# Patient Record
Sex: FEMALE | Race: WHITE | Employment: OTHER | URBAN - METROPOLITAN AREA
[De-identification: names, ages, dates, MRNs, and addresses within clinical notes are randomized per-mention and may not be internally consistent; named-entity substitution may affect disease eponyms.]

---

## 2017-01-07 RX ORDER — HYDROCHLOROTHIAZIDE 12.5 MG/1
TABLET ORAL
Qty: 90 TABLET | Refills: 1 | Status: SHIPPED | OUTPATIENT
Start: 2017-01-07 | End: 2017-06-17

## 2017-02-09 ENCOUNTER — TELEPHONE (OUTPATIENT)
Dept: INTERNAL MEDICINE CLINIC | Facility: CLINIC | Age: 77
End: 2017-02-09

## 2017-02-24 ENCOUNTER — TELEPHONE (OUTPATIENT)
Dept: INTERNAL MEDICINE CLINIC | Facility: CLINIC | Age: 77
End: 2017-02-24

## 2017-03-03 ENCOUNTER — TELEPHONE (OUTPATIENT)
Dept: INTERNAL MEDICINE CLINIC | Facility: CLINIC | Age: 77
End: 2017-03-03

## 2017-04-11 ENCOUNTER — OFFICE VISIT (OUTPATIENT)
Dept: INTERNAL MEDICINE CLINIC | Facility: CLINIC | Age: 77
End: 2017-04-11

## 2017-04-11 VITALS
HEART RATE: 86 BPM | SYSTOLIC BLOOD PRESSURE: 136 MMHG | RESPIRATION RATE: 20 BRPM | WEIGHT: 151 LBS | BODY MASS INDEX: 26 KG/M2 | DIASTOLIC BLOOD PRESSURE: 77 MMHG

## 2017-04-11 DIAGNOSIS — I10 ESSENTIAL HYPERTENSION: Primary | ICD-10-CM

## 2017-04-11 DIAGNOSIS — M81.0 AGE-RELATED OSTEOPOROSIS WITHOUT CURRENT PATHOLOGICAL FRACTURE: ICD-10-CM

## 2017-04-11 DIAGNOSIS — E78.00 PURE HYPERCHOLESTEROLEMIA: ICD-10-CM

## 2017-04-11 DIAGNOSIS — E55.9 VITAMIN D DEFICIENCY: ICD-10-CM

## 2017-04-11 PROCEDURE — 99214 OFFICE O/P EST MOD 30 MIN: CPT | Performed by: INTERNAL MEDICINE

## 2017-04-11 PROCEDURE — G0463 HOSPITAL OUTPT CLINIC VISIT: HCPCS | Performed by: INTERNAL MEDICINE

## 2017-04-11 RX ORDER — RISEDRONATE SODIUM 5 MG/1
5 TABLET, FILM COATED ORAL
Qty: 90 TABLET | Refills: 3 | Status: SHIPPED | OUTPATIENT
Start: 2017-04-11 | End: 2017-10-26

## 2017-04-12 ENCOUNTER — LAB ENCOUNTER (OUTPATIENT)
Dept: LAB | Age: 77
End: 2017-04-12
Attending: INTERNAL MEDICINE
Payer: MEDICARE

## 2017-04-12 DIAGNOSIS — E78.00 PURE HYPERCHOLESTEROLEMIA: ICD-10-CM

## 2017-04-12 DIAGNOSIS — E55.9 VITAMIN D DEFICIENCY: ICD-10-CM

## 2017-04-12 DIAGNOSIS — I10 ESSENTIAL HYPERTENSION: ICD-10-CM

## 2017-04-12 PROCEDURE — 80061 LIPID PANEL: CPT

## 2017-04-12 PROCEDURE — 80053 COMPREHEN METABOLIC PANEL: CPT

## 2017-04-12 PROCEDURE — 36415 COLL VENOUS BLD VENIPUNCTURE: CPT

## 2017-04-12 PROCEDURE — 82306 VITAMIN D 25 HYDROXY: CPT

## 2017-04-12 PROCEDURE — 85025 COMPLETE CBC W/AUTO DIFF WBC: CPT

## 2017-04-16 NOTE — PROGRESS NOTES
HPI:    Patient ID: Melissa Navarro is a 69year old female presents for follow-up of hypertension. HPI  Patient states that she has been feeling well, she returns from for details and came back for appointment right away.   She has been taking all scleral icterus. Neck: Normal range of motion. Neck supple. No JVD present. Cardiovascular: Normal rate, regular rhythm and normal heart sounds. No murmur heard. Edema not present. Pulmonary/Chest: Effort normal. No respiratory distress.  She has

## 2017-04-19 RX ORDER — CHOLECALCIFEROL (VITAMIN D3) 125 MCG
CAPSULE ORAL
Qty: 1 TABLET | Refills: 0 | COMMUNITY
Start: 2017-04-19

## 2017-06-13 ENCOUNTER — TELEPHONE (OUTPATIENT)
Dept: INTERNAL MEDICINE CLINIC | Facility: CLINIC | Age: 77
End: 2017-06-13

## 2017-06-13 NOTE — TELEPHONE ENCOUNTER
Pt is eligible for a Medicare Annual Health Assessment anytime during the calendar year. Left message to call back [phone].

## 2017-06-17 RX ORDER — HYDROCHLOROTHIAZIDE 12.5 MG/1
TABLET ORAL
Qty: 90 TABLET | Refills: 1 | Status: SHIPPED | OUTPATIENT
Start: 2017-06-17 | End: 2019-06-29

## 2017-08-15 RX ORDER — HYDROCHLOROTHIAZIDE 12.5 MG/1
TABLET ORAL
Qty: 90 TABLET | Refills: 1 | Status: SHIPPED | OUTPATIENT
Start: 2017-08-15 | End: 2017-10-26

## 2017-08-22 ENCOUNTER — TELEPHONE (OUTPATIENT)
Dept: INTERNAL MEDICINE CLINIC | Facility: CLINIC | Age: 77
End: 2017-08-22

## 2017-09-19 RX ORDER — HYDROCHLOROTHIAZIDE 12.5 MG/1
TABLET ORAL
Qty: 90 TABLET | Refills: 1 | Status: SHIPPED | OUTPATIENT
Start: 2017-09-19 | End: 2017-10-26

## 2017-10-23 PROBLEM — I77.1 TORTUOUS AORTA (HCC): Status: ACTIVE | Noted: 2017-10-23

## 2017-10-23 PROBLEM — R73.9 HYPERGLYCEMIA: Status: ACTIVE | Noted: 2017-10-23

## 2017-10-23 PROBLEM — I77.1 TORTUOUS AORTA: Status: ACTIVE | Noted: 2017-10-23

## 2017-10-26 ENCOUNTER — LAB ENCOUNTER (OUTPATIENT)
Dept: LAB | Age: 77
End: 2017-10-26
Attending: INTERNAL MEDICINE
Payer: MEDICARE

## 2017-10-26 ENCOUNTER — OFFICE VISIT (OUTPATIENT)
Dept: INTERNAL MEDICINE CLINIC | Facility: CLINIC | Age: 77
End: 2017-10-26

## 2017-10-26 VITALS
HEIGHT: 63.5 IN | RESPIRATION RATE: 18 BRPM | DIASTOLIC BLOOD PRESSURE: 68 MMHG | TEMPERATURE: 98 F | HEART RATE: 73 BPM | WEIGHT: 151 LBS | SYSTOLIC BLOOD PRESSURE: 124 MMHG | BODY MASS INDEX: 26.42 KG/M2

## 2017-10-26 DIAGNOSIS — I10 ESSENTIAL HYPERTENSION: ICD-10-CM

## 2017-10-26 DIAGNOSIS — E55.9 VITAMIN D DEFICIENCY: ICD-10-CM

## 2017-10-26 DIAGNOSIS — M81.0 AGE-RELATED OSTEOPOROSIS WITHOUT CURRENT PATHOLOGICAL FRACTURE: ICD-10-CM

## 2017-10-26 DIAGNOSIS — Z00.00 PHYSICAL EXAM, ANNUAL: Primary | ICD-10-CM

## 2017-10-26 PROBLEM — R73.9 HYPERGLYCEMIA: Status: RESOLVED | Noted: 2017-10-23 | Resolved: 2017-10-26

## 2017-10-26 PROCEDURE — 36415 COLL VENOUS BLD VENIPUNCTURE: CPT

## 2017-10-26 PROCEDURE — G0009 ADMIN PNEUMOCOCCAL VACCINE: HCPCS | Performed by: INTERNAL MEDICINE

## 2017-10-26 PROCEDURE — 85025 COMPLETE CBC W/AUTO DIFF WBC: CPT

## 2017-10-26 PROCEDURE — 82306 VITAMIN D 25 HYDROXY: CPT

## 2017-10-26 PROCEDURE — 80053 COMPREHEN METABOLIC PANEL: CPT

## 2017-10-26 PROCEDURE — G0438 PPPS, INITIAL VISIT: HCPCS | Performed by: INTERNAL MEDICINE

## 2017-10-26 PROCEDURE — 90670 PCV13 VACCINE IM: CPT | Performed by: INTERNAL MEDICINE

## 2017-10-26 PROCEDURE — 96160 PT-FOCUSED HLTH RISK ASSMT: CPT | Performed by: INTERNAL MEDICINE

## 2017-10-27 NOTE — PROGRESS NOTES
HPI:   Adriana Kaiser is a 69year old female who presents for a MA (Medicare Advantage) Supervisit (Once per calendar year).     Patient reports that overall she has been feeling well, only problem she has mild right knee pain that is manageable, pa that she has never smoked. She does not have any smokeless tobacco history on file. She reports that she does not drink alcohol or use drugs.      REVIEW OF SYSTEMS:   GENERAL: feels well otherwise  SKIN: denies any unusual skin lesions  EYES: denies blurre I have trouble understanding the speaker in a large room such as at a meeting or place of Zoroastrianism:  Sometimes   Many people I talk to seem to mumble (or don't speak clearly):  No People get annoyed because I misunderstand what they say:  No   I misundersta Pneumococcal, Zoster, Tetanus     Immunization History   Administered Date(s) Administered   • Pneumococcal (Prevnar 13) 10/26/2017       ASSESSMENT AND OTHER RELEVANT CHRONIC CONDITIONS:   Yahir Ruiz is a 69year old female who presents for a M patient maintain a good energy level?: Appropriate Exercise    How would you describe your daily physical activity?: Moderate    How would you describe your current health state?: Good    How do you maintain positive mental well-being?: Visiting Friends;Vi Incorrect       This section provided for quick review of chart, separate sheet to patient  [address] Internal Lab or Procedure External Lab or Procedure   Diabetes Screening      HbgA1C   Annually   Lab Results  [place] Influenza  Covered Annually  Please get every year    Pneumococcal 13 (Prevnar)  Covered Once after 65 10/26/2017 Please get once after your 65th birthday    Pneumococcal 23 (Pneumovax)  Covered Once after 65 No vaccine history found Please get once af found.     Dilated Eye exam  Annually No flowsheet data found. No flowsheet data found. COPD      Spirometry Testing Annually Spirometry date:  No flowsheet data found.          Template: ISMAEL CHARLES MEDICARE ANNUAL ASSESSMENT FEMALE [06196]

## 2017-12-01 RX ORDER — AMLODIPINE BESYLATE 5 MG/1
TABLET ORAL
Qty: 90 TABLET | Refills: 3 | Status: SHIPPED | OUTPATIENT
Start: 2017-12-01 | End: 2018-07-13

## 2017-12-04 RX ORDER — BENAZEPRIL HYDROCHLORIDE 40 MG/1
TABLET, FILM COATED ORAL
Qty: 90 TABLET | Refills: 3 | Status: SHIPPED | OUTPATIENT
Start: 2017-12-04 | End: 2018-07-13

## 2018-01-16 ENCOUNTER — TELEPHONE (OUTPATIENT)
Dept: INTERNAL MEDICINE CLINIC | Facility: CLINIC | Age: 78
End: 2018-01-16

## 2018-01-17 NOTE — TELEPHONE ENCOUNTER
Placard parking form received via mail at [facility] location. Pt also attached prepaid envelope so it can be mailed to [facility] [employer]. Form placed in [facility]en[facility] Marquez.

## 2018-01-23 NOTE — TELEPHONE ENCOUNTER
Handicap form completed and signed by Dr. Sharmaine Crump and mailed it to Sec of states per pt. Requests.

## 2018-06-11 RX ORDER — HYDROCHLOROTHIAZIDE 12.5 MG/1
TABLET ORAL
Qty: 90 TABLET | Refills: 0 | Status: SHIPPED | OUTPATIENT
Start: 2018-06-11 | End: 2018-07-13

## 2018-06-13 ENCOUNTER — PATIENT MESSAGE (OUTPATIENT)
Dept: INTERNAL MEDICINE CLINIC | Facility: CLINIC | Age: 78
End: 2018-06-13

## 2018-06-13 NOTE — TELEPHONE ENCOUNTER
Phone room can you please adjust her schedule. Thanks. From: Julio Cagle  To: Colleen Rae MD  Sent: 6/13/2018 10:42 AM CDT  Subject: Visit Follow-up Question    I would like to change my appt.  from June 18 to earlier this week or later in

## 2018-07-13 ENCOUNTER — OFFICE VISIT (OUTPATIENT)
Dept: INTERNAL MEDICINE CLINIC | Facility: CLINIC | Age: 78
End: 2018-07-13

## 2018-07-13 VITALS
DIASTOLIC BLOOD PRESSURE: 77 MMHG | WEIGHT: 152 LBS | BODY MASS INDEX: 26.6 KG/M2 | RESPIRATION RATE: 18 BRPM | HEIGHT: 63.5 IN | TEMPERATURE: 98 F | SYSTOLIC BLOOD PRESSURE: 126 MMHG | HEART RATE: 71 BPM

## 2018-07-13 DIAGNOSIS — M81.0 AGE-RELATED OSTEOPOROSIS WITHOUT CURRENT PATHOLOGICAL FRACTURE: ICD-10-CM

## 2018-07-13 DIAGNOSIS — R07.2 PRECORDIAL PAIN: Primary | ICD-10-CM

## 2018-07-13 DIAGNOSIS — I77.1 TORTUOUS AORTA (HCC): ICD-10-CM

## 2018-07-13 DIAGNOSIS — I10 ESSENTIAL HYPERTENSION: ICD-10-CM

## 2018-07-13 PROCEDURE — 96160 PT-FOCUSED HLTH RISK ASSMT: CPT | Performed by: INTERNAL MEDICINE

## 2018-07-13 PROCEDURE — G0439 PPPS, SUBSEQ VISIT: HCPCS | Performed by: INTERNAL MEDICINE

## 2018-07-14 NOTE — PROGRESS NOTES
HPI:   Olya Guzman is a 69year old female who presents for a MA (Medicare Advantage) Supervisit (Once per calendar year).     Patient reports that lately she is experiencing left-sided chest discomfort that comes and goes, she is concerned about ([address]    Wt Readings from Last 3 Encounters:  07/13/18 : 152 lb (68.9 kg)  10/26/17 : 151 lb (68.5 kg)  04/11/17 : 151 lb (68.5 kg)     Last Cholesterol Labs:     Lab Results  Component Value Date   CHOLEST 191 04/12/2017   HDL 45 04/12/2017    (H) Negative for gait disturbance, paresthesias.    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 No flowsheet data found. Fecal Occult Blood Annually No results found for: FOB No flowsheet data found. Glaucoma Screening      Ophthalmology Visit Annually: Diabetics, FHx Glaucoma, AA>50, > 65 No flowsheet data found.     Enrique Rubio DISEASE MONITORING Internal Lab or Procedure External Lab or Procedure      Annual Monitoring of Persistent     Medications (ACE/ARB, digoxin diuretics, anticonvulsants.)    Potassium  Annually Potassium (mmol/L)   Date Value   10/26/2017 3.5     POTASSIUM

## 2018-07-17 ENCOUNTER — LAB ENCOUNTER (OUTPATIENT)
Dept: LAB | Age: 78
End: 2018-07-17
Attending: INTERNAL MEDICINE
Payer: MEDICARE

## 2018-07-17 DIAGNOSIS — R07.2 PRECORDIAL PAIN: ICD-10-CM

## 2018-07-17 LAB
ALBUMIN SERPL BCP-MCNC: 4 G/DL (ref 3.5–4.8)
ALBUMIN/GLOB SERPL: 1.1 {RATIO} (ref 1–2)
ALP SERPL-CCNC: 54 U/L (ref 32–100)
ALT SERPL-CCNC: 31 U/L (ref 14–54)
ANION GAP SERPL CALC-SCNC: 10 MMOL/L (ref 0–18)
AST SERPL-CCNC: 33 U/L (ref 15–41)
BASOPHILS # BLD: 0 K/UL (ref 0–0.2)
BASOPHILS NFR BLD: 1 %
BILIRUB SERPL-MCNC: 0.7 MG/DL (ref 0.3–1.2)
BUN SERPL-MCNC: 12 MG/DL (ref 8–20)
BUN/CREAT SERPL: 12.1 (ref 10–20)
CALCIUM SERPL-MCNC: 9.2 MG/DL (ref 8.5–10.5)
CHLORIDE SERPL-SCNC: 100 MMOL/L (ref 95–110)
CHOLEST SERPL-MCNC: 199 MG/DL (ref 110–200)
CO2 SERPL-SCNC: 27 MMOL/L (ref 22–32)
CREAT SERPL-MCNC: 0.99 MG/DL (ref 0.5–1.5)
EOSINOPHIL # BLD: 0.1 K/UL (ref 0–0.7)
EOSINOPHIL NFR BLD: 2 %
ERYTHROCYTE [DISTWIDTH] IN BLOOD BY AUTOMATED COUNT: 14.1 % (ref 11–15)
GLOBULIN PLAS-MCNC: 3.6 G/DL (ref 2.5–3.7)
GLUCOSE SERPL-MCNC: 115 MG/DL (ref 70–99)
HCT VFR BLD AUTO: 42.3 % (ref 35–48)
HDLC SERPL-MCNC: 38 MG/DL
HGB BLD-MCNC: 14.3 G/DL (ref 12–16)
LDLC SERPL CALC-MCNC: 129 MG/DL (ref 0–99)
LYMPHOCYTES # BLD: 1.3 K/UL (ref 1–4)
LYMPHOCYTES NFR BLD: 29 %
MCH RBC QN AUTO: 31.3 PG (ref 27–32)
MCHC RBC AUTO-ENTMCNC: 33.8 G/DL (ref 32–37)
MCV RBC AUTO: 92.5 FL (ref 80–100)
MONOCYTES # BLD: 0.4 K/UL (ref 0–1)
MONOCYTES NFR BLD: 10 %
NEUTROPHILS # BLD AUTO: 2.7 K/UL (ref 1.8–7.7)
NEUTROPHILS NFR BLD: 59 %
NONHDLC SERPL-MCNC: 161 MG/DL
OSMOLALITY UR CALC.SUM OF ELEC: 285 MOSM/KG (ref 275–295)
PATIENT FASTING: YES
PLATELET # BLD AUTO: 239 K/UL (ref 140–400)
PMV BLD AUTO: 8.1 FL (ref 7.4–10.3)
POTASSIUM SERPL-SCNC: 4.1 MMOL/L (ref 3.3–5.1)
PROT SERPL-MCNC: 7.6 G/DL (ref 5.9–8.4)
RBC # BLD AUTO: 4.58 M/UL (ref 3.7–5.4)
SODIUM SERPL-SCNC: 137 MMOL/L (ref 136–144)
TRIGL SERPL-MCNC: 159 MG/DL (ref 1–149)
WBC # BLD AUTO: 4.6 K/UL (ref 4–11)

## 2018-07-17 PROCEDURE — 85025 COMPLETE CBC W/AUTO DIFF WBC: CPT

## 2018-07-17 PROCEDURE — 82306 VITAMIN D 25 HYDROXY: CPT

## 2018-07-17 PROCEDURE — 36415 COLL VENOUS BLD VENIPUNCTURE: CPT

## 2018-07-17 PROCEDURE — 80061 LIPID PANEL: CPT

## 2018-07-17 PROCEDURE — 80053 COMPREHEN METABOLIC PANEL: CPT

## 2018-07-18 LAB — 25(OH)D3 SERPL-MCNC: 39.9 NG/ML

## 2018-07-19 ENCOUNTER — TELEPHONE (OUTPATIENT)
Dept: INTERNAL MEDICINE CLINIC | Facility: CLINIC | Age: 78
End: 2018-07-19

## 2018-07-19 DIAGNOSIS — R89.9 ABNORMAL LABORATORY TEST: Primary | ICD-10-CM

## 2018-08-08 ENCOUNTER — APPOINTMENT (OUTPATIENT)
Dept: LAB | Age: 78
End: 2018-08-08
Attending: INTERNAL MEDICINE
Payer: MEDICARE

## 2018-08-08 DIAGNOSIS — R89.9 ABNORMAL LABORATORY TEST: ICD-10-CM

## 2018-08-08 LAB
ANION GAP SERPL CALC-SCNC: 9 MMOL/L (ref 0–18)
BUN SERPL-MCNC: 10 MG/DL (ref 8–20)
BUN/CREAT SERPL: 10.9 (ref 10–20)
CALCIUM SERPL-MCNC: 8.9 MG/DL (ref 8.5–10.5)
CHLORIDE SERPL-SCNC: 102 MMOL/L (ref 95–110)
CO2 SERPL-SCNC: 26 MMOL/L (ref 22–32)
CREAT SERPL-MCNC: 0.92 MG/DL (ref 0.5–1.5)
GLUCOSE SERPL-MCNC: 110 MG/DL (ref 70–99)
OSMOLALITY UR CALC.SUM OF ELEC: 284 MOSM/KG (ref 275–295)
POTASSIUM SERPL-SCNC: 3.9 MMOL/L (ref 3.3–5.1)
SODIUM SERPL-SCNC: 137 MMOL/L (ref 136–144)

## 2018-08-08 PROCEDURE — 80048 BASIC METABOLIC PNL TOTAL CA: CPT

## 2018-08-08 PROCEDURE — 36415 COLL VENOUS BLD VENIPUNCTURE: CPT

## 2018-09-19 RX ORDER — HYDROCHLOROTHIAZIDE 12.5 MG/1
TABLET ORAL
Qty: 90 TABLET | Refills: 0 | Status: SHIPPED | OUTPATIENT
Start: 2018-09-19 | End: 2018-12-17

## 2018-11-19 ENCOUNTER — PATIENT MESSAGE (OUTPATIENT)
Dept: INTERNAL MEDICINE CLINIC | Facility: CLINIC | Age: 78
End: 2018-11-19

## 2018-11-19 NOTE — TELEPHONE ENCOUNTER
From: Zaida Cagle  To: Nickolas Laura MD  Sent: 11/19/2018 7:48 AM CST  Subject: Non-Urgent Medical Question    I am supposed to have a stress test and supposed to call a number for appointment, it was requested on my last visit.  I did not have i

## 2018-12-19 RX ORDER — BENAZEPRIL HYDROCHLORIDE 40 MG/1
TABLET, FILM COATED ORAL
Qty: 90 TABLET | Refills: 0 | Status: SHIPPED | OUTPATIENT
Start: 2018-12-19 | End: 2019-03-30

## 2018-12-19 RX ORDER — AMLODIPINE BESYLATE 5 MG/1
TABLET ORAL
Qty: 90 TABLET | Refills: 0 | Status: SHIPPED | OUTPATIENT
Start: 2018-12-19 | End: 2019-03-30

## 2018-12-19 RX ORDER — HYDROCHLOROTHIAZIDE 12.5 MG/1
TABLET ORAL
Qty: 90 TABLET | Refills: 0 | Status: SHIPPED | OUTPATIENT
Start: 2018-12-19 | End: 2019-03-30

## 2019-03-25 ENCOUNTER — PATIENT OUTREACH (OUTPATIENT)
Dept: CASE MANAGEMENT | Age: 79
End: 2019-03-25

## 2019-03-25 NOTE — PROGRESS NOTES
Outreached to patient in regards to scheduling Medicare Annual exam [facility]), not able to leave a message, phone rings, rings and then goes into busy tone. I will call at a later time. Patient is eligible at this time. Thank you.

## 2019-03-30 ENCOUNTER — PATIENT MESSAGE (OUTPATIENT)
Dept: INTERNAL MEDICINE CLINIC | Facility: CLINIC | Age: 79
End: 2019-03-30

## 2019-03-30 NOTE — TELEPHONE ENCOUNTER
From: Jeferson Cagle  To: Loretta Saldana MD  Sent: 3/30/2019 6:50 AM CDT  Subject: Prescription Question    I need authorization refill on my prescription at [facility] [place] .  Thank you

## 2019-03-31 RX ORDER — HYDROCHLOROTHIAZIDE 12.5 MG/1
TABLET ORAL
Qty: 90 TABLET | Refills: 0 | Status: SHIPPED | OUTPATIENT
Start: 2019-03-31 | End: 2019-06-29

## 2019-03-31 RX ORDER — BENAZEPRIL HYDROCHLORIDE 40 MG/1
TABLET, FILM COATED ORAL
Qty: 90 TABLET | Refills: 0 | Status: SHIPPED | OUTPATIENT
Start: 2019-03-31 | End: 2019-06-29

## 2019-03-31 RX ORDER — AMLODIPINE BESYLATE 5 MG/1
TABLET ORAL
Qty: 90 TABLET | Refills: 0 | Status: SHIPPED | OUTPATIENT
Start: 2019-03-31 | End: 2019-06-29

## 2019-04-01 NOTE — TELEPHONE ENCOUNTER
Sent follow up message to patient, advised call our office if still needing auth to process refills. Also notified patient 3 refills sent to pharmacy 3/30 by Dr Michele Strauss. Call back if still needing assistance with refills.

## 2019-04-15 ENCOUNTER — PATIENT OUTREACH (OUTPATIENT)
Dept: CASE MANAGEMENT | Age: 79
End: 2019-04-15

## 2019-04-15 NOTE — PROGRESS NOTES
Second outreached to patient in regards to scheduling Medicare Annual exam (AHA). Left message for patient to return my call at ext. [phone]. Patient is eligible at this time. Thank you.

## 2019-05-06 ENCOUNTER — PATIENT MESSAGE (OUTPATIENT)
Dept: INTERNAL MEDICINE CLINIC | Facility: CLINIC | Age: 79
End: 2019-05-06

## 2019-05-06 ENCOUNTER — TELEPHONE (OUTPATIENT)
Dept: INTERNAL MEDICINE CLINIC | Facility: CLINIC | Age: 79
End: 2019-05-06

## 2019-05-06 DIAGNOSIS — H54.3 DECREASED VISION IN BOTH EYES: Primary | ICD-10-CM

## 2019-05-06 NOTE — TELEPHONE ENCOUNTER
From: Francoise Cagle  To: Uyen Tadeo MD  Sent: 5/6/2019 2:56 PM CDT  Subject: Jane Díaz, thank you.

## 2019-05-06 NOTE — TELEPHONE ENCOUNTER
From: Oli Cagle  To: Charlie Perales MD  Sent: 5/6/2019 11:39 AM CDT  Subject: Other    Dr. Mikey Fraga, I called for an appointment with an ophthalmologist in [facility] and they told me to call you for a referral. Dr. Arlette Lowery or [facility] a

## 2019-05-06 NOTE — TELEPHONE ENCOUNTER
From: Teo Cagle  To: Edmund Talbot MD  Sent: 5/6/2019 2:57 PM CDT  Subject: Other    [id number] [address]

## 2019-05-11 ENCOUNTER — PATIENT MESSAGE (OUTPATIENT)
Dept: INTERNAL MEDICINE CLINIC | Facility: CLINIC | Age: 79
End: 2019-05-11

## 2019-05-13 ENCOUNTER — TELEPHONE (OUTPATIENT)
Dept: INTERNAL MEDICINE CLINIC | Facility: CLINIC | Age: 79
End: 2019-05-13

## 2019-05-13 NOTE — TELEPHONE ENCOUNTER
Patient left message this morning on referral line for referrral to dr doll?     Returned call and left message to call managed care referral dept    Rony

## 2019-05-26 ENCOUNTER — PATIENT MESSAGE (OUTPATIENT)
Dept: INTERNAL MEDICINE CLINIC | Facility: CLINIC | Age: 79
End: 2019-05-26

## 2019-05-26 NOTE — TELEPHONE ENCOUNTER
Managed care,  Please advise if there is anything else needed to help facilitate approval of the referral.

## 2019-05-26 NOTE — TELEPHONE ENCOUNTER
From: Elise Cagle  To: Adam Gan MD  Sent: 5/26/2019 8:40 AM CDT  Subject: Referral Request    I called the office responsible for this request for approval and gave her my new provider and ID, did they follow up and requested the approval?

## 2019-05-26 NOTE — TELEPHONE ENCOUNTER
From: Heriberto Cagle  To: Simone Olivier MD  Sent: 5/26/2019 9:59 AM CDT  Subject: Referral Request    Yes, please.

## 2019-05-27 ENCOUNTER — PATIENT MESSAGE (OUTPATIENT)
Dept: INTERNAL MEDICINE CLINIC | Facility: CLINIC | Age: 79
End: 2019-05-27

## 2019-05-28 NOTE — TELEPHONE ENCOUNTER
From: Eliu Cagle  To: Mony Mccarthy MD  Sent: 5/27/2019 2:50 PM CDT  Subject: Referral Request    Yes

## 2019-06-10 ENCOUNTER — TELEPHONE (OUTPATIENT)
Dept: INTERNAL MEDICINE CLINIC | Facility: CLINIC | Age: 79
End: 2019-06-10

## 2019-06-10 NOTE — TELEPHONE ENCOUNTER
Spoke with patient on 5/13/19 and informed her insurance was not updated. She stated she spoke with office and updated insurance. Informed her still needs to be updated in order for referral to be process and then approved to insurance. She verbally understood.     Rony

## 2019-06-10 NOTE — TELEPHONE ENCOUNTER
Spoke with Nandini from [facility], she updated insurance and I processed referral. Referral is authorized . Called patient and informed her referral is approved.      (faxed to Dr. Roger Nicole office as well)    Precious Hunt

## 2019-06-10 NOTE — TELEPHONE ENCOUNTER
Called Do (RN) and discussed regarding referral -  (managed care) we do not reply to email only to telephone encounter through internal. Insurance needed to be updated and I have contacted administration supervisor and nothing was completed.     Rony

## 2019-06-10 NOTE — TELEPHONE ENCOUNTER
Responding telephone encounter 6/8/19- cannot process referral to Dr. Maame Ellsworth because insurance is not loaded. I have contacted administration supervisor a few times to complete registration but has not been completed yet.     Please have insurance loaded on PPD then I can finish processing referral.    Thanks,    Rony

## 2019-06-13 NOTE — TELEPHONE ENCOUNTER
No further action needed.    Referral Notes   Number of Notes: 5   Type Date User Summary Attachment    06/10/2019 11:08 AM Aylin Miller - -   Note    Patient notified , faxed referral to Dr. Kae noe

## 2019-06-29 RX ORDER — AMLODIPINE BESYLATE 5 MG/1
TABLET ORAL
Qty: 90 TABLET | Refills: 1 | Status: SHIPPED | OUTPATIENT
Start: 2019-06-29 | End: 2020-01-15

## 2019-06-29 RX ORDER — HYDROCHLOROTHIAZIDE 12.5 MG/1
TABLET ORAL
Qty: 90 TABLET | Refills: 1 | Status: SHIPPED | OUTPATIENT
Start: 2019-06-29 | End: 2020-01-15

## 2019-06-29 RX ORDER — BENAZEPRIL HYDROCHLORIDE 40 MG/1
TABLET, FILM COATED ORAL
Qty: 90 TABLET | Refills: 1 | Status: SHIPPED | OUTPATIENT
Start: 2019-06-29 | End: 2020-01-15

## 2019-07-10 ENCOUNTER — MA CHART PREP (OUTPATIENT)
Dept: FAMILY MEDICINE CLINIC | Facility: CLINIC | Age: 79
End: 2019-07-10

## 2019-07-15 ENCOUNTER — OFFICE VISIT (OUTPATIENT)
Dept: INTERNAL MEDICINE CLINIC | Facility: CLINIC | Age: 79
End: 2019-07-15
Payer: MEDICARE

## 2019-07-15 VITALS
DIASTOLIC BLOOD PRESSURE: 76 MMHG | WEIGHT: 149.5 LBS | HEART RATE: 76 BPM | SYSTOLIC BLOOD PRESSURE: 128 MMHG | BODY MASS INDEX: 26.16 KG/M2 | HEIGHT: 63.5 IN

## 2019-07-15 DIAGNOSIS — M81.0 AGE-RELATED OSTEOPOROSIS WITHOUT CURRENT PATHOLOGICAL FRACTURE: ICD-10-CM

## 2019-07-15 DIAGNOSIS — H25.89 OTHER AGE-RELATED CATARACT OF BOTH EYES: ICD-10-CM

## 2019-07-15 DIAGNOSIS — Z00.00 PHYSICAL EXAM, ANNUAL: Primary | ICD-10-CM

## 2019-07-15 DIAGNOSIS — I10 ESSENTIAL HYPERTENSION: ICD-10-CM

## 2019-07-15 DIAGNOSIS — E78.00 PURE HYPERCHOLESTEROLEMIA: ICD-10-CM

## 2019-07-15 DIAGNOSIS — I77.1 TORTUOUS AORTA (HCC): ICD-10-CM

## 2019-07-15 PROCEDURE — 99397 PER PM REEVAL EST PAT 65+ YR: CPT | Performed by: INTERNAL MEDICINE

## 2019-07-15 PROCEDURE — 96160 PT-FOCUSED HLTH RISK ASSMT: CPT | Performed by: INTERNAL MEDICINE

## 2019-07-15 PROCEDURE — 90732 PPSV23 VACC 2 YRS+ SUBQ/IM: CPT | Performed by: INTERNAL MEDICINE

## 2019-07-15 PROCEDURE — G0439 PPPS, SUBSEQ VISIT: HCPCS | Performed by: INTERNAL MEDICINE

## 2019-07-15 PROCEDURE — G0009 ADMIN PNEUMOCOCCAL VACCINE: HCPCS | Performed by: INTERNAL MEDICINE

## 2019-07-15 NOTE — PROGRESS NOTES
HPI:   Collins Phipps is a 67year old female who presents for a MA (Medicare Advantage) Supervisit (Once per calendar year).     Patient reports that she is feeling well, she joined senior citizen support groups provided by insurance, learning to [employer]ai Wyatt was screened for Alcohol abuse and had a score of 0 so is at low risk.     Patient Care Team: Patient Care Team:  Christina Livingston MD as PCP - General (Internal Medicine)    Patient Active Problem List:     Osteoporosis     Essential h Constitutional:  Negative for decreased activity, fever, irritability and lethargy  Allergic/Immuno: Negative for environmental allergies  Cardiovascular:  Negative for chest pain and irregular heartbeat/palpitations  Respiratory:  Negative for cough, have trouble understanding the speech of women and children:  No I have trouble understanding the speaker in a large room such as at a meeting or place of Confucianism:  No   Many people I talk to seem to mumble (or don't speak clearly):  No People get annoyed 10/26/2017   Pended Date(s) Pended   • Pneumovax 23 07/15/2019        ASSESSMENT AND OTHER RELEVANT CHRONIC CONDITIONS:   Darwin Crespo is a 67year old female who presents for a Medicare Assessment.      PLAN SUMMARY:   Diagnoses and all orders for No flowsheet data found.     Fasting Blood Sugar (FSB)Annually Glucose (mg/dL)   Date Value   08/08/2018 110 (H)     GLUCOSE (P) (mg/dL)   Date Value   06/20/2016 113 (H)          Cardiovascular Disease Screening     LDL Annually LDL Cholesterol (mg/dL)   D for Moderate/High Risk No vaccine history found Medium/high risk factors:   End-stage renal disease   Hemophiliacs who received Factor VIII or IX concentrates   Clients of institutions for the mentally retarded   Persons who live in the same house as a Hep

## 2019-07-16 ENCOUNTER — LAB ENCOUNTER (OUTPATIENT)
Dept: LAB | Age: 79
End: 2019-07-16
Attending: INTERNAL MEDICINE
Payer: MEDICARE

## 2019-07-16 DIAGNOSIS — E78.00 PURE HYPERCHOLESTEROLEMIA: ICD-10-CM

## 2019-07-16 DIAGNOSIS — I10 ESSENTIAL HYPERTENSION: ICD-10-CM

## 2019-07-16 LAB
ALBUMIN SERPL-MCNC: 3.8 G/DL (ref 3.4–5)
ALBUMIN/GLOB SERPL: 1 {RATIO} (ref 1–2)
ALP LIVER SERPL-CCNC: 64 U/L (ref 55–142)
ALT SERPL-CCNC: 38 U/L (ref 13–56)
ANION GAP SERPL CALC-SCNC: 5 MMOL/L (ref 0–18)
AST SERPL-CCNC: 30 U/L (ref 15–37)
BASOPHILS # BLD AUTO: 0.03 X10(3) UL (ref 0–0.2)
BASOPHILS NFR BLD AUTO: 0.3 %
BILIRUB SERPL-MCNC: 0.5 MG/DL (ref 0.1–2)
BUN BLD-MCNC: 13 MG/DL (ref 7–18)
BUN/CREAT SERPL: 15.3 (ref 10–20)
CALCIUM BLD-MCNC: 9.1 MG/DL (ref 8.5–10.1)
CHLORIDE SERPL-SCNC: 104 MMOL/L (ref 98–112)
CHOLEST SMN-MCNC: 197 MG/DL (ref ?–200)
CO2 SERPL-SCNC: 30 MMOL/L (ref 21–32)
CREAT BLD-MCNC: 0.85 MG/DL (ref 0.55–1.02)
DEPRECATED RDW RBC AUTO: 47.3 FL (ref 35.1–46.3)
EOSINOPHIL # BLD AUTO: 0.09 X10(3) UL (ref 0–0.7)
EOSINOPHIL NFR BLD AUTO: 1 %
ERYTHROCYTE [DISTWIDTH] IN BLOOD BY AUTOMATED COUNT: 13.7 % (ref 11–15)
GLOBULIN PLAS-MCNC: 3.9 G/DL (ref 2.8–4.4)
GLUCOSE BLD-MCNC: 106 MG/DL (ref 70–99)
HCT VFR BLD AUTO: 41.3 % (ref 35–48)
HDLC SERPL-MCNC: 46 MG/DL (ref 40–59)
HGB BLD-MCNC: 13.5 G/DL (ref 12–16)
IMM GRANULOCYTES # BLD AUTO: 0.01 X10(3) UL (ref 0–1)
IMM GRANULOCYTES NFR BLD: 0.1 %
LDLC SERPL CALC-MCNC: 120 MG/DL (ref ?–100)
LYMPHOCYTES # BLD AUTO: 1.41 X10(3) UL (ref 1–4)
LYMPHOCYTES NFR BLD AUTO: 15.1 %
M PROTEIN MFR SERPL ELPH: 7.7 G/DL (ref 6.4–8.2)
MCH RBC QN AUTO: 30.7 PG (ref 26–34)
MCHC RBC AUTO-ENTMCNC: 32.7 G/DL (ref 31–37)
MCV RBC AUTO: 93.9 FL (ref 80–100)
MONOCYTES # BLD AUTO: 0.72 X10(3) UL (ref 0.1–1)
MONOCYTES NFR BLD AUTO: 7.7 %
NEUTROPHILS # BLD AUTO: 7.07 X10 (3) UL (ref 1.5–7.7)
NEUTROPHILS # BLD AUTO: 7.07 X10(3) UL (ref 1.5–7.7)
NEUTROPHILS NFR BLD AUTO: 75.8 %
NONHDLC SERPL-MCNC: 151 MG/DL (ref ?–130)
OSMOLALITY SERPL CALC.SUM OF ELEC: 289 MOSM/KG (ref 275–295)
PATIENT FASTING: YES
PATIENT FASTING: YES
PLATELET # BLD AUTO: 225 10(3)UL (ref 150–450)
POTASSIUM SERPL-SCNC: 4.1 MMOL/L (ref 3.5–5.1)
RBC # BLD AUTO: 4.4 X10(6)UL (ref 3.8–5.3)
SODIUM SERPL-SCNC: 139 MMOL/L (ref 136–145)
TRIGL SERPL-MCNC: 155 MG/DL (ref 30–149)
VLDLC SERPL CALC-MCNC: 31 MG/DL (ref 0–30)
WBC # BLD AUTO: 9.3 X10(3) UL (ref 4–11)

## 2019-07-16 PROCEDURE — 85025 COMPLETE CBC W/AUTO DIFF WBC: CPT

## 2019-07-16 PROCEDURE — 80061 LIPID PANEL: CPT

## 2019-07-16 PROCEDURE — 36415 COLL VENOUS BLD VENIPUNCTURE: CPT

## 2019-07-16 PROCEDURE — 80053 COMPREHEN METABOLIC PANEL: CPT

## 2019-07-21 PROBLEM — Z86.69 HISTORY OF GLAUCOMA: Status: ACTIVE | Noted: 2019-07-21

## 2020-01-14 ENCOUNTER — TELEPHONE (OUTPATIENT)
Dept: INTERNAL MEDICINE CLINIC | Facility: CLINIC | Age: 80
End: 2020-01-14

## 2020-01-15 RX ORDER — HYDROCHLOROTHIAZIDE 12.5 MG/1
TABLET ORAL
Qty: 90 TABLET | Refills: 0 | Status: SHIPPED | OUTPATIENT
Start: 2020-01-15 | End: 2020-03-13

## 2020-01-15 RX ORDER — BENAZEPRIL HYDROCHLORIDE 40 MG/1
TABLET, FILM COATED ORAL
Qty: 90 TABLET | Refills: 0 | Status: SHIPPED | OUTPATIENT
Start: 2020-01-15 | End: 2020-03-13

## 2020-01-15 RX ORDER — AMLODIPINE BESYLATE 5 MG/1
TABLET ORAL
Qty: 90 TABLET | Refills: 0 | Status: SHIPPED | OUTPATIENT
Start: 2020-01-15 | End: 2020-03-13

## 2020-01-16 ENCOUNTER — PATIENT MESSAGE (OUTPATIENT)
Dept: INTERNAL MEDICINE CLINIC | Facility: CLINIC | Age: 80
End: 2020-01-16

## 2020-01-16 NOTE — TELEPHONE ENCOUNTER
From: Karen Cagle  To: Rush Snyder MD  Sent: 1/16/2020 10:59 AM CST  Subject: Prescription Question    Pls authorize my prescription refill at [facility]. I only have 1 day meds left today. Thank you.

## 2020-02-05 ENCOUNTER — LAB ENCOUNTER (OUTPATIENT)
Dept: LAB | Age: 80
End: 2020-02-05
Attending: INTERNAL MEDICINE
Payer: MEDICARE

## 2020-02-05 ENCOUNTER — HOSPITAL ENCOUNTER (OUTPATIENT)
Dept: GENERAL RADIOLOGY | Age: 80
Discharge: HOME | End: 2020-02-05
Attending: INTERNAL MEDICINE
Payer: MEDICARE

## 2020-02-05 ENCOUNTER — OFFICE VISIT (OUTPATIENT)
Dept: INTERNAL MEDICINE CLINIC | Facility: CLINIC | Age: 80
End: 2020-02-05
Payer: MEDICARE

## 2020-02-05 VITALS
HEART RATE: 71 BPM | RESPIRATION RATE: 18 BRPM | BODY MASS INDEX: 24.5 KG/M2 | WEIGHT: 140 LBS | HEIGHT: 63.5 IN | DIASTOLIC BLOOD PRESSURE: 67 MMHG | SYSTOLIC BLOOD PRESSURE: 129 MMHG

## 2020-02-05 DIAGNOSIS — I10 ESSENTIAL HYPERTENSION: ICD-10-CM

## 2020-02-05 DIAGNOSIS — R09.89 CAROTID BRUIT, UNSPECIFIED LATERALITY: ICD-10-CM

## 2020-02-05 DIAGNOSIS — M25.511 CHRONIC RIGHT SHOULDER PAIN: ICD-10-CM

## 2020-02-05 DIAGNOSIS — I77.1 TORTUOUS AORTA (HCC): ICD-10-CM

## 2020-02-05 DIAGNOSIS — M67.911 ROTATOR CUFF DISORDER, RIGHT: ICD-10-CM

## 2020-02-05 DIAGNOSIS — G89.29 CHRONIC RIGHT SHOULDER PAIN: ICD-10-CM

## 2020-02-05 DIAGNOSIS — R07.89 CHEST DISCOMFORT: ICD-10-CM

## 2020-02-05 DIAGNOSIS — E78.00 PURE HYPERCHOLESTEROLEMIA: Primary | ICD-10-CM

## 2020-02-05 PROCEDURE — 99214 OFFICE O/P EST MOD 30 MIN: CPT | Performed by: INTERNAL MEDICINE

## 2020-02-05 PROCEDURE — 73030 X-RAY EXAM OF SHOULDER: CPT | Performed by: INTERNAL MEDICINE

## 2020-02-05 PROCEDURE — 71046 X-RAY EXAM CHEST 2 VIEWS: CPT | Performed by: INTERNAL MEDICINE

## 2020-02-06 ENCOUNTER — APPOINTMENT (OUTPATIENT)
Dept: LAB | Age: 80
End: 2020-02-06
Attending: INTERNAL MEDICINE
Payer: MEDICARE

## 2020-02-06 ENCOUNTER — LAB ENCOUNTER (OUTPATIENT)
Dept: LAB | Age: 80
End: 2020-02-06
Attending: INTERNAL MEDICINE
Payer: MEDICARE

## 2020-02-06 DIAGNOSIS — I10 ESSENTIAL HYPERTENSION: ICD-10-CM

## 2020-02-06 DIAGNOSIS — R07.89 CHEST DISCOMFORT: ICD-10-CM

## 2020-02-06 DIAGNOSIS — E78.00 PURE HYPERCHOLESTEROLEMIA: ICD-10-CM

## 2020-02-06 LAB
ALBUMIN SERPL-MCNC: 3.7 G/DL (ref 3.4–5)
ALBUMIN/GLOB SERPL: 1 {RATIO} (ref 1–2)
ALP LIVER SERPL-CCNC: 69 U/L (ref 55–142)
ALT SERPL-CCNC: 31 U/L (ref 13–56)
ANION GAP SERPL CALC-SCNC: 5 MMOL/L (ref 0–18)
AST SERPL-CCNC: 25 U/L (ref 15–37)
BASOPHILS # BLD AUTO: 0.04 X10(3) UL (ref 0–0.2)
BASOPHILS NFR BLD AUTO: 0.8 %
BILIRUB SERPL-MCNC: 0.2 MG/DL (ref 0.1–2)
BUN BLD-MCNC: 17 MG/DL (ref 7–18)
BUN/CREAT SERPL: 18.1 (ref 10–20)
CALCIUM BLD-MCNC: 8.6 MG/DL (ref 8.5–10.1)
CHLORIDE SERPL-SCNC: 105 MMOL/L (ref 98–112)
CHOLEST SMN-MCNC: 188 MG/DL (ref ?–200)
CO2 SERPL-SCNC: 29 MMOL/L (ref 21–32)
CREAT BLD-MCNC: 0.94 MG/DL (ref 0.55–1.02)
DEPRECATED RDW RBC AUTO: 46.2 FL (ref 35.1–46.3)
EOSINOPHIL # BLD AUTO: 0.1 X10(3) UL (ref 0–0.7)
EOSINOPHIL NFR BLD AUTO: 2.1 %
ERYTHROCYTE [DISTWIDTH] IN BLOOD BY AUTOMATED COUNT: 13.5 % (ref 11–15)
GLOBULIN PLAS-MCNC: 3.8 G/DL (ref 2.8–4.4)
GLUCOSE BLD-MCNC: 104 MG/DL (ref 70–99)
HCT VFR BLD AUTO: 41.7 % (ref 35–48)
HDLC SERPL-MCNC: 47 MG/DL (ref 40–59)
HGB BLD-MCNC: 14.1 G/DL (ref 12–16)
IMM GRANULOCYTES # BLD AUTO: 0.01 X10(3) UL (ref 0–1)
IMM GRANULOCYTES NFR BLD: 0.2 %
LDLC SERPL CALC-MCNC: 125 MG/DL (ref ?–100)
LYMPHOCYTES # BLD AUTO: 1.34 X10(3) UL (ref 1–4)
LYMPHOCYTES NFR BLD AUTO: 28.1 %
M PROTEIN MFR SERPL ELPH: 7.5 G/DL (ref 6.4–8.2)
MCH RBC QN AUTO: 31.4 PG (ref 26–34)
MCHC RBC AUTO-ENTMCNC: 33.8 G/DL (ref 31–37)
MCV RBC AUTO: 92.9 FL (ref 80–100)
MONOCYTES # BLD AUTO: 0.36 X10(3) UL (ref 0.1–1)
MONOCYTES NFR BLD AUTO: 7.5 %
NEUTROPHILS # BLD AUTO: 2.92 X10 (3) UL (ref 1.5–7.7)
NEUTROPHILS # BLD AUTO: 2.92 X10(3) UL (ref 1.5–7.7)
NEUTROPHILS NFR BLD AUTO: 61.3 %
NONHDLC SERPL-MCNC: 141 MG/DL (ref ?–130)
OSMOLALITY SERPL CALC.SUM OF ELEC: 290 MOSM/KG (ref 275–295)
PATIENT FASTING Y/N/NP: YES
PATIENT FASTING Y/N/NP: YES
PLATELET # BLD AUTO: 234 10(3)UL (ref 150–450)
POTASSIUM SERPL-SCNC: 4.2 MMOL/L (ref 3.5–5.1)
RBC # BLD AUTO: 4.49 X10(6)UL (ref 3.8–5.3)
SODIUM SERPL-SCNC: 139 MMOL/L (ref 136–145)
TRIGL SERPL-MCNC: 79 MG/DL (ref 30–149)
VLDLC SERPL CALC-MCNC: 16 MG/DL (ref 0–30)
WBC # BLD AUTO: 4.8 X10(3) UL (ref 4–11)

## 2020-02-06 PROCEDURE — 85025 COMPLETE CBC W/AUTO DIFF WBC: CPT

## 2020-02-06 PROCEDURE — 93005 ELECTROCARDIOGRAM TRACING: CPT

## 2020-02-06 PROCEDURE — 93010 ELECTROCARDIOGRAM REPORT: CPT | Performed by: INTERNAL MEDICINE

## 2020-02-06 PROCEDURE — 80053 COMPREHEN METABOLIC PANEL: CPT

## 2020-02-06 PROCEDURE — 36415 COLL VENOUS BLD VENIPUNCTURE: CPT

## 2020-02-06 PROCEDURE — 80061 LIPID PANEL: CPT

## 2020-02-06 NOTE — PROGRESS NOTES
HPI:    Patient ID: Faustino Bryant is a 67year old female.   Presents for follow-up on hypertension, chest discomfort, right shoulder pain    HPI  Patient reports that periodically she has been bothered by right posterior scapular chest pain, at lisa Normocephalic and atraumatic. Eyes: Pupils are equal, round, and reactive to light. Conjunctivae and EOM are normal. No scleral icterus. Neck: Normal range of motion. Neck supple. No JVD present.    Cardiovascular: Normal rate, regular rhythm and normal

## 2020-02-12 ENCOUNTER — OFFICE VISIT (OUTPATIENT)
Dept: INTERNAL MEDICINE CLINIC | Facility: CLINIC | Age: 80
End: 2020-02-12
Payer: MEDICARE

## 2020-02-12 VITALS
TEMPERATURE: 98 F | HEART RATE: 84 BPM | DIASTOLIC BLOOD PRESSURE: 68 MMHG | BODY MASS INDEX: 24 KG/M2 | WEIGHT: 139 LBS | SYSTOLIC BLOOD PRESSURE: 117 MMHG

## 2020-02-12 DIAGNOSIS — E78.5 HYPERLIPIDEMIA LDL GOAL <100: ICD-10-CM

## 2020-02-12 DIAGNOSIS — I10 ESSENTIAL HYPERTENSION: ICD-10-CM

## 2020-02-12 DIAGNOSIS — Z00.00 PHYSICAL EXAM, ANNUAL: Primary | ICD-10-CM

## 2020-02-12 DIAGNOSIS — I77.1 TORTUOUS AORTA (HCC): ICD-10-CM

## 2020-02-12 DIAGNOSIS — M81.0 AGE-RELATED OSTEOPOROSIS WITHOUT CURRENT PATHOLOGICAL FRACTURE: ICD-10-CM

## 2020-02-12 DIAGNOSIS — Z86.69 HISTORY OF GLAUCOMA: ICD-10-CM

## 2020-02-12 PROCEDURE — G0439 PPPS, SUBSEQ VISIT: HCPCS | Performed by: INTERNAL MEDICINE

## 2020-02-12 PROCEDURE — 96160 PT-FOCUSED HLTH RISK ASSMT: CPT | Performed by: INTERNAL MEDICINE

## 2020-02-12 PROCEDURE — 99397 PER PM REEVAL EST PAT 65+ YR: CPT | Performed by: INTERNAL MEDICINE

## 2020-02-13 NOTE — PROGRESS NOTES
HPI:   Jhonatan Galdamez is a 67year old female who presents for a MA (Medicare Advantage) Supervisit (Once per calendar year).     Patient reports that she has been feeling fair, she will be starting physical therapy for rotator calf dysfunction, rece glaucoma    Wt Readings from Last 3 Encounters:  02/12/20 : 139 lb (63 kg)  02/05/20 : 140 lb (63.5 kg)  07/15/19 : 149 lb 8 oz (67.8 kg)     Last Cholesterol Labs:   Lab Results   Component Value Date    CHOLEST 188 02/06/2020    HDL 47 02/06/2020    LDL decreased appetite, diarrhea and vomiting  Genitourinary:  Negative for dysuria and hematuria  Hema/Lymph:  Negative for easy bleeding and easy bruising  Integumentary:  Negative for pruritus and rash  Musculoskeletal: Positive for bone/joint symptoms  Isha members and friends have told me they think I may have hearing loss:   Yes                Visual Acuity                           Physical Exam        Constitutional: appears well hydrated alert and responsive no acute distress noted  Head/Face: normocephal clinically stable, see ophthalmology periodically    Age-related osteoporosis without current pathological fracture patient will continue calcium supplement vitamin D, declined treatment         Diet assessment: good     PLAN:  The patient indicates unders Diabetics, FHx Glaucoma, AA>50, > 65 No flowsheet data found. Bone Density Screening      Dexascan Every two years Last Dexa Scan:   XR DEXA BONE DENSITOMETRY (CPT=77080) 11/21/2014    No flowsheet data found.     Pap and Pelvic      Pap: Every 3 anticonvulsants.)    Potassium  Annually Potassium (mmol/L)   Date Value   02/06/2020 4.2     POTASSIUM (P) (mmol/L)   Date Value   06/20/2016 3.8    No flowsheet data found.     Creatinine  Annually Creatinine (mg/dL)   Date Value   02/06/2020 0.94    No f

## 2020-02-19 ENCOUNTER — TELEPHONE (OUTPATIENT)
Dept: INTERNAL MEDICINE CLINIC | Facility: CLINIC | Age: 80
End: 2020-02-19

## 2020-02-20 NOTE — TELEPHONE ENCOUNTER
PER CLINICAL REQUEST,    Physical therapy referral pending approval    Please allow 5-10 days for approval    Patient can call [facility] for update     [id number] [facility]

## 2020-03-13 RX ORDER — HYDROCHLOROTHIAZIDE 12.5 MG/1
12.5 TABLET ORAL
Qty: 90 TABLET | Refills: 1 | Status: SHIPPED | OUTPATIENT
Start: 2020-03-13 | End: 2020-03-24

## 2020-03-13 RX ORDER — BENAZEPRIL HYDROCHLORIDE 40 MG/1
40 TABLET, FILM COATED ORAL
Qty: 90 TABLET | Refills: 1 | Status: SHIPPED | OUTPATIENT
Start: 2020-03-13 | End: 2020-03-24

## 2020-03-13 RX ORDER — AMLODIPINE BESYLATE 5 MG/1
5 TABLET ORAL
Qty: 90 TABLET | Refills: 1 | Status: SHIPPED | OUTPATIENT
Start: 2020-03-13 | End: 2020-03-24

## 2020-03-13 NOTE — TELEPHONE ENCOUNTER
Pharmacy is requesting a new RX for the following medications:       •  HYDROCHLOROTHIAZIDE 12.5 MG Oral Tab, TAKE 1 TABLET BY MOUTH EVERY DAY, Disp: 90 tablet, Rfl: 0  •  BENAZEPRIL HCL 40 MG Oral Tab, TAKE 1 TABLET BY MOUTH EVERY DAY, Disp: 90 tablet, Rf

## 2020-03-14 NOTE — TELEPHONE ENCOUNTER
Refill passed per [facility] protocol.     Hypertensive Medications  Protocol Criteria:  · Appointment scheduled in the past 6 months or in the next 3 months  · BMP or CMP in the past 12 months  · Creatinine result < 2  Recent Outpatient Visits

## 2020-03-24 ENCOUNTER — TELEPHONE (OUTPATIENT)
Dept: INTERNAL MEDICINE CLINIC | Facility: CLINIC | Age: 80
End: 2020-03-24

## 2020-03-24 RX ORDER — BENAZEPRIL HYDROCHLORIDE 40 MG/1
40 TABLET, FILM COATED ORAL
Qty: 90 TABLET | Refills: 1 | Status: CANCELLED | OUTPATIENT
Start: 2020-03-24

## 2020-03-24 RX ORDER — AMLODIPINE BESYLATE 5 MG/1
5 TABLET ORAL
Qty: 90 TABLET | Refills: 1 | Status: SHIPPED | OUTPATIENT
Start: 2020-03-24 | End: 2020-07-24

## 2020-03-24 RX ORDER — HYDROCHLOROTHIAZIDE 12.5 MG/1
12.5 TABLET ORAL
Qty: 90 TABLET | Refills: 1 | Status: SHIPPED | OUTPATIENT
Start: 2020-03-24 | End: 2020-07-24

## 2020-03-24 RX ORDER — BENAZEPRIL HYDROCHLORIDE 40 MG/1
40 TABLET, FILM COATED ORAL
Qty: 90 TABLET | Refills: 1 | Status: SHIPPED | OUTPATIENT
Start: 2020-03-24 | End: 2020-10-16

## 2020-03-24 NOTE — TELEPHONE ENCOUNTER
Pharmacy sent fax on 3/18 requesting 90 day refill for:     amLODIPine Besylate 5 MG Oral Tab  hydrochlorothiazide 12.5 MG Oral Tab  Benazepril HCl 40 MG Oral Tab    Please advise.

## 2020-07-24 ENCOUNTER — TELEPHONE (OUTPATIENT)
Dept: INTERNAL MEDICINE CLINIC | Facility: CLINIC | Age: 80
End: 2020-07-24

## 2020-07-24 NOTE — TELEPHONE ENCOUNTER
-  Please see message below. I have pended amlodipine and hydrochlorothiazide (15 tablets each) with local [facility].     Thanks

## 2020-07-25 RX ORDER — AMLODIPINE BESYLATE 5 MG/1
5 TABLET ORAL
Qty: 15 TABLET | Refills: 0 | Status: SHIPPED | OUTPATIENT
Start: 2020-07-25 | End: 2020-07-27

## 2020-07-25 RX ORDER — HYDROCHLOROTHIAZIDE 12.5 MG/1
12.5 TABLET ORAL
Qty: 15 TABLET | Refills: 0 | Status: SHIPPED | OUTPATIENT
Start: 2020-07-25 | End: 2020-07-27

## 2020-07-27 RX ORDER — AMLODIPINE BESYLATE 5 MG/1
5 TABLET ORAL
Qty: 15 TABLET | Refills: 0 | Status: SHIPPED | OUTPATIENT
Start: 2020-07-27 | End: 2020-10-27

## 2020-07-27 RX ORDER — HYDROCHLOROTHIAZIDE 12.5 MG/1
12.5 TABLET ORAL
Qty: 15 TABLET | Refills: 0 | Status: SHIPPED | OUTPATIENT
Start: 2020-07-27 | End: 2020-10-27

## 2020-09-08 ENCOUNTER — PATIENT MESSAGE (OUTPATIENT)
Dept: INTERNAL MEDICINE CLINIC | Facility: CLINIC | Age: 80
End: 2020-09-08

## 2020-09-09 NOTE — TELEPHONE ENCOUNTER
From: Tanisha Cagle  To: Patsy Zendejas MD  Sent: 9/8/2020 9:26 PM CDT  Subject: Non-Urgent Medical Question    Thank you.

## 2020-09-17 ENCOUNTER — LAB ENCOUNTER (OUTPATIENT)
Dept: LAB | Age: 80
End: 2020-09-17
Attending: INTERNAL MEDICINE
Payer: MEDICARE

## 2020-09-17 DIAGNOSIS — I10 ESSENTIAL HYPERTENSION: ICD-10-CM

## 2020-09-17 LAB
ALBUMIN SERPL-MCNC: 3.6 G/DL (ref 3.4–5)
ALBUMIN/GLOB SERPL: 0.9 {RATIO} (ref 1–2)
ALP LIVER SERPL-CCNC: 67 U/L (ref 55–142)
ALT SERPL-CCNC: 35 U/L (ref 13–56)
ANION GAP SERPL CALC-SCNC: 2 MMOL/L (ref 0–18)
AST SERPL-CCNC: 34 U/L (ref 15–37)
BASOPHILS # BLD AUTO: 0.04 X10(3) UL (ref 0–0.2)
BASOPHILS NFR BLD AUTO: 0.6 %
BILIRUB SERPL-MCNC: 0.5 MG/DL (ref 0.1–2)
BUN BLD-MCNC: 18 MG/DL (ref 7–18)
BUN/CREAT SERPL: 19.1 (ref 10–20)
CALCIUM BLD-MCNC: 9.4 MG/DL (ref 8.5–10.1)
CHLORIDE SERPL-SCNC: 106 MMOL/L (ref 98–112)
CO2 SERPL-SCNC: 31 MMOL/L (ref 21–32)
CREAT BLD-MCNC: 0.94 MG/DL (ref 0.55–1.02)
DEPRECATED RDW RBC AUTO: 46.8 FL (ref 35.1–46.3)
EOSINOPHIL # BLD AUTO: 0.09 X10(3) UL (ref 0–0.7)
EOSINOPHIL NFR BLD AUTO: 1.4 %
ERYTHROCYTE [DISTWIDTH] IN BLOOD BY AUTOMATED COUNT: 13.7 % (ref 11–15)
GLOBULIN PLAS-MCNC: 4.2 G/DL (ref 2.8–4.4)
GLUCOSE BLD-MCNC: 108 MG/DL (ref 70–99)
HCT VFR BLD AUTO: 41.4 % (ref 35–48)
HGB BLD-MCNC: 13.8 G/DL (ref 12–16)
IMM GRANULOCYTES # BLD AUTO: 0 X10(3) UL (ref 0–1)
IMM GRANULOCYTES NFR BLD: 0 %
LYMPHOCYTES # BLD AUTO: 1.33 X10(3) UL (ref 1–4)
LYMPHOCYTES NFR BLD AUTO: 20.8 %
M PROTEIN MFR SERPL ELPH: 7.8 G/DL (ref 6.4–8.2)
MCH RBC QN AUTO: 31.1 PG (ref 26–34)
MCHC RBC AUTO-ENTMCNC: 33.3 G/DL (ref 31–37)
MCV RBC AUTO: 93.2 FL (ref 80–100)
MONOCYTES # BLD AUTO: 0.53 X10(3) UL (ref 0.1–1)
MONOCYTES NFR BLD AUTO: 8.3 %
NEUTROPHILS # BLD AUTO: 4.39 X10 (3) UL (ref 1.5–7.7)
NEUTROPHILS # BLD AUTO: 4.39 X10(3) UL (ref 1.5–7.7)
NEUTROPHILS NFR BLD AUTO: 68.9 %
OSMOLALITY SERPL CALC.SUM OF ELEC: 290 MOSM/KG (ref 275–295)
PATIENT FASTING Y/N/NP: YES
PLATELET # BLD AUTO: 224 10(3)UL (ref 150–450)
POTASSIUM SERPL-SCNC: 4.4 MMOL/L (ref 3.5–5.1)
RBC # BLD AUTO: 4.44 X10(6)UL (ref 3.8–5.3)
SODIUM SERPL-SCNC: 139 MMOL/L (ref 136–145)
WBC # BLD AUTO: 6.4 X10(3) UL (ref 4–11)

## 2020-09-17 PROCEDURE — 80053 COMPREHEN METABOLIC PANEL: CPT

## 2020-09-17 PROCEDURE — 36415 COLL VENOUS BLD VENIPUNCTURE: CPT

## 2020-09-17 PROCEDURE — 85025 COMPLETE CBC W/AUTO DIFF WBC: CPT

## 2020-10-16 RX ORDER — BENAZEPRIL HYDROCHLORIDE 40 MG/1
TABLET, FILM COATED ORAL
Qty: 90 TABLET | Refills: 1 | Status: SHIPPED | OUTPATIENT
Start: 2020-10-16 | End: 2021-07-07

## 2020-10-27 RX ORDER — AMLODIPINE BESYLATE 5 MG/1
5 TABLET ORAL
Qty: 90 TABLET | Refills: 0 | Status: SHIPPED | OUTPATIENT
Start: 2020-10-27 | End: 2021-01-28

## 2020-10-27 RX ORDER — HYDROCHLOROTHIAZIDE 12.5 MG/1
12.5 TABLET ORAL
Qty: 90 TABLET | Refills: 0 | Status: SHIPPED | OUTPATIENT
Start: 2020-10-27 | End: 2021-01-28

## 2020-10-27 NOTE — TELEPHONE ENCOUNTER
Pt sent a [employer] message about these 2 meds. Somehow they didn't get refilled when the other med did earlier in the month.      Pt wants to know if ok to be without these meds until they arrive, she will run out in 2 days and the mail delivery will take ab

## 2020-12-04 ENCOUNTER — OFFICE VISIT (OUTPATIENT)
Dept: INTERNAL MEDICINE CLINIC | Facility: CLINIC | Age: 80
End: 2020-12-04
Payer: MEDICARE

## 2020-12-04 VITALS
RESPIRATION RATE: 17 BRPM | BODY MASS INDEX: 24.74 KG/M2 | WEIGHT: 141.38 LBS | SYSTOLIC BLOOD PRESSURE: 103 MMHG | HEART RATE: 74 BPM | HEIGHT: 63.5 IN | DIASTOLIC BLOOD PRESSURE: 62 MMHG

## 2020-12-04 DIAGNOSIS — E55.9 VITAMIN D DEFICIENCY: ICD-10-CM

## 2020-12-04 DIAGNOSIS — M81.0 AGE-RELATED OSTEOPOROSIS WITHOUT CURRENT PATHOLOGICAL FRACTURE: ICD-10-CM

## 2020-12-04 DIAGNOSIS — I10 ESSENTIAL HYPERTENSION: ICD-10-CM

## 2020-12-04 DIAGNOSIS — I77.1 TORTUOUS AORTA (HCC): ICD-10-CM

## 2020-12-04 DIAGNOSIS — Z00.00 PHYSICAL EXAM, ANNUAL: Primary | ICD-10-CM

## 2020-12-04 PROCEDURE — 3078F DIAST BP <80 MM HG: CPT | Performed by: INTERNAL MEDICINE

## 2020-12-04 PROCEDURE — 3008F BODY MASS INDEX DOCD: CPT | Performed by: INTERNAL MEDICINE

## 2020-12-04 PROCEDURE — 3074F SYST BP LT 130 MM HG: CPT | Performed by: INTERNAL MEDICINE

## 2020-12-04 PROCEDURE — 99214 OFFICE O/P EST MOD 30 MIN: CPT | Performed by: INTERNAL MEDICINE

## 2020-12-07 NOTE — PROGRESS NOTES
HPI:   Melissa Navarro is a 79year old female who presents for a MA (Medicare Advantage) Supervisit (Once per calendar year).     Patient reports that, keeps physically active, tries to eat healthy diet,    Fall/Risk Assessment   She has been screene Chemistry Labs:   Lab Results   Component Value Date    AST 34 09/17/2020    ALT 35 09/17/2020    CA 9.4 09/17/2020    ALB 3.6 09/17/2020    CREATSERUM 0.94 09/17/2020     (H) 09/17/2020        CBC  (most recent labs)   Lab Results   Component Value symptoms  Neurological:  Negative for gait disturbance, paresthesias  Psychiatric:  Negative for inappropriate interaction and psychiatric symptoms       EXAM:   /62 (BP Location: Right arm, Patient Position: Sitting, Cuff Size: adult)   Pulse 74   R well-nourished. No distress. HENT:   Head: Normocephalic and atraumatic. Right Ear: Tympanic membrane is not erythematous. No cerumen present  Left Ear: Tympanic membrane is not erythematous.  No cerumen present  Eyes: Pupils are equal, round, and react osteoporosis without current pathological fracture continue calcium and vitamin D regular physical activities, patient declined treatment in the past         Diet assessment: good     PLAN:  The patient indicates understanding of these issues and agrees to > 65 No flowsheet data found. Bone Density Screening      Dexascan Every two years Last Dexa Scan:   XR DEXA BONE DENSITOMETRY (CPT=77080) 11/21/2014    No flowsheet data found.     Pap and Pelvic      Pap: Every 3 yrs age 21-65 or Pap+HPV every Potassium (mmol/L)   Date Value   09/17/2020 4.4     POTASSIUM (P) (mmol/L)   Date Value   06/20/2016 3.8    No flowsheet data found. Creatinine  Annually Creatinine (mg/dL)   Date Value   09/17/2020 0.94    No flowsheet data found.     Drug Serum Conc

## 2021-01-01 NOTE — TELEPHONE ENCOUNTER
From: Nydia Cagle  To: Rudy Molina MD  Sent: 5/6/2019 2:58 PM CDT  Subject: Other    I read all the messages and replied. Thank you. [Birthweight ___ kg] : weight [unfilled] kg [Weight ___ kg] : weight [unfilled] kg [Length ___ cm] : length [unfilled] cm [Head Circumference ___ cm] : head circumference [unfilled] cm [de-identified] : Baby girl Susan born at 40.1 weeks via  for failure to progress\par to 22 year old  blood type O+ mother. Fetal alert for DORV/TGA/VSD.\par Maternal history of HSV on Valtrex. Prenatal labs nr/immune/-, Covid - both\par parents. GBS - on . SROM at 12 AM on  with clear fluids. Baby emerged\par nuchal x 1, \par  APGARS 8 & 8  [de-identified] : Term    Congenital cardiac disese Double outlet RV    VSD    Transposition of Great Vessels    Coarchtation of Aorta   Feeding problems in    Congenital Heart Failure  Anemia  Abnormal screen  ge REFLUX

## 2021-01-07 ENCOUNTER — LAB ENCOUNTER (OUTPATIENT)
Dept: LAB | Age: 81
End: 2021-01-07
Attending: INTERNAL MEDICINE
Payer: MEDICARE

## 2021-01-07 DIAGNOSIS — E55.9 VITAMIN D DEFICIENCY: ICD-10-CM

## 2021-01-07 DIAGNOSIS — I10 ESSENTIAL HYPERTENSION: ICD-10-CM

## 2021-01-07 DIAGNOSIS — R73.9 HYPERGLYCEMIA: Primary | ICD-10-CM

## 2021-01-07 DIAGNOSIS — R73.9 HYPERGLYCEMIA: ICD-10-CM

## 2021-01-07 LAB
ALBUMIN SERPL-MCNC: 3.8 G/DL (ref 3.4–5)
ALBUMIN/GLOB SERPL: 0.9 {RATIO} (ref 1–2)
ALP LIVER SERPL-CCNC: 71 U/L
ALT SERPL-CCNC: 43 U/L
ANION GAP SERPL CALC-SCNC: 3 MMOL/L (ref 0–18)
AST SERPL-CCNC: 28 U/L (ref 15–37)
BASOPHILS # BLD AUTO: 0.05 X10(3) UL (ref 0–0.2)
BASOPHILS NFR BLD AUTO: 0.9 %
BILIRUB SERPL-MCNC: 0.5 MG/DL (ref 0.1–2)
BUN BLD-MCNC: 18 MG/DL (ref 7–18)
BUN/CREAT SERPL: 18.6 (ref 10–20)
CALCIUM BLD-MCNC: 9.6 MG/DL (ref 8.5–10.1)
CHLORIDE SERPL-SCNC: 105 MMOL/L (ref 98–112)
CO2 SERPL-SCNC: 32 MMOL/L (ref 21–32)
CREAT BLD-MCNC: 0.97 MG/DL
DEPRECATED RDW RBC AUTO: 47 FL (ref 35.1–46.3)
EOSINOPHIL # BLD AUTO: 0.11 X10(3) UL (ref 0–0.7)
EOSINOPHIL NFR BLD AUTO: 2 %
ERYTHROCYTE [DISTWIDTH] IN BLOOD BY AUTOMATED COUNT: 13.8 % (ref 11–15)
EST. AVERAGE GLUCOSE BLD GHB EST-MCNC: 123 MG/DL (ref 68–126)
GLOBULIN PLAS-MCNC: 4.2 G/DL (ref 2.8–4.4)
GLUCOSE BLD-MCNC: 108 MG/DL (ref 70–99)
HBA1C MFR BLD HPLC: 5.9 % (ref ?–5.7)
HCT VFR BLD AUTO: 43.7 %
HGB BLD-MCNC: 14.4 G/DL
IMM GRANULOCYTES # BLD AUTO: 0.02 X10(3) UL (ref 0–1)
IMM GRANULOCYTES NFR BLD: 0.4 %
LYMPHOCYTES # BLD AUTO: 1.59 X10(3) UL (ref 1–4)
LYMPHOCYTES NFR BLD AUTO: 28.9 %
M PROTEIN MFR SERPL ELPH: 8 G/DL (ref 6.4–8.2)
MCH RBC QN AUTO: 30.6 PG (ref 26–34)
MCHC RBC AUTO-ENTMCNC: 33 G/DL (ref 31–37)
MCV RBC AUTO: 92.8 FL
MONOCYTES # BLD AUTO: 0.55 X10(3) UL (ref 0.1–1)
MONOCYTES NFR BLD AUTO: 10 %
NEUTROPHILS # BLD AUTO: 3.18 X10 (3) UL (ref 1.5–7.7)
NEUTROPHILS # BLD AUTO: 3.18 X10(3) UL (ref 1.5–7.7)
NEUTROPHILS NFR BLD AUTO: 57.8 %
OSMOLALITY SERPL CALC.SUM OF ELEC: 292 MOSM/KG (ref 275–295)
PATIENT FASTING Y/N/NP: YES
PLATELET # BLD AUTO: 248 10(3)UL (ref 150–450)
POTASSIUM SERPL-SCNC: 4.1 MMOL/L (ref 3.5–5.1)
RBC # BLD AUTO: 4.71 X10(6)UL
SODIUM SERPL-SCNC: 140 MMOL/L (ref 136–145)
WBC # BLD AUTO: 5.5 X10(3) UL (ref 4–11)

## 2021-01-07 PROCEDURE — 80053 COMPREHEN METABOLIC PANEL: CPT

## 2021-01-07 PROCEDURE — 85025 COMPLETE CBC W/AUTO DIFF WBC: CPT

## 2021-01-07 PROCEDURE — 36415 COLL VENOUS BLD VENIPUNCTURE: CPT

## 2021-01-07 PROCEDURE — 83036 HEMOGLOBIN GLYCOSYLATED A1C: CPT

## 2021-01-07 PROCEDURE — 82306 VITAMIN D 25 HYDROXY: CPT

## 2021-01-08 LAB — 25(OH)D3 SERPL-MCNC: 34 NG/ML (ref 30–100)

## 2021-01-28 RX ORDER — AMLODIPINE BESYLATE 5 MG/1
5 TABLET ORAL DAILY
Qty: 90 TABLET | Refills: 3 | Status: SHIPPED | OUTPATIENT
Start: 2021-01-28 | End: 2021-01-28

## 2021-01-28 RX ORDER — HYDROCHLOROTHIAZIDE 12.5 MG/1
12.5 TABLET ORAL DAILY
Qty: 90 TABLET | Refills: 3 | Status: SHIPPED | OUTPATIENT
Start: 2021-01-28 | End: 2021-10-22

## 2021-01-28 RX ORDER — AMLODIPINE BESYLATE 5 MG/1
5 TABLET ORAL DAILY
Qty: 90 TABLET | Refills: 3 | Status: SHIPPED | OUTPATIENT
Start: 2021-01-28 | End: 2021-10-22

## 2021-01-28 RX ORDER — HYDROCHLOROTHIAZIDE 12.5 MG/1
12.5 TABLET ORAL DAILY
Qty: 90 TABLET | Refills: 3 | Status: SHIPPED | OUTPATIENT
Start: 2021-01-28 | End: 2021-01-28

## 2021-03-12 DIAGNOSIS — Z23 NEED FOR VACCINATION: ICD-10-CM

## 2021-03-14 ENCOUNTER — HOSPITAL ENCOUNTER (OUTPATIENT)
Age: 81
Discharge: HOME | End: 2021-03-14
Payer: MEDICARE

## 2021-03-14 VITALS
HEIGHT: 63 IN | RESPIRATION RATE: 16 BRPM | TEMPERATURE: 98 F | BODY MASS INDEX: 23.92 KG/M2 | DIASTOLIC BLOOD PRESSURE: 80 MMHG | HEART RATE: 88 BPM | SYSTOLIC BLOOD PRESSURE: 147 MMHG | WEIGHT: 135 LBS

## 2021-03-14 DIAGNOSIS — Z20.822 ENCOUNTER FOR LABORATORY TESTING FOR COVID-19 VIRUS: Primary | ICD-10-CM

## 2021-03-14 LAB — SARS-COV-2 RNA RESP QL NAA+PROBE: NOT DETECTED

## 2021-03-14 PROCEDURE — 99202 OFFICE O/P NEW SF 15 MIN: CPT | Performed by: NURSE PRACTITIONER

## 2021-03-14 PROCEDURE — U0002 COVID-19 LAB TEST NON-CDC: HCPCS | Performed by: NURSE PRACTITIONER

## 2021-03-14 NOTE — ED PROVIDER NOTES
Patient Seen in: [facility]      History   Patient presents with:  Covid-19 Test    Stated Complaint: Covid test - travel    HPI/Subjective:   HPI    This is a 52-year-old female with a history of hypertension who presents with a chief compla Conjunctiva/sclera: Conjunctivae normal.      Pupils: Pupils are equal, round, and reactive to light. Cardiovascular:      Rate and Rhythm: Normal rate and regular rhythm. Pulses: Normal pulses. Heart sounds: Normal heart sounds.    Pulmonary:

## 2021-04-26 ENCOUNTER — TELEPHONE (OUTPATIENT)
Dept: INTERNAL MEDICINE CLINIC | Facility: CLINIC | Age: 81
End: 2021-04-26

## 2021-04-26 NOTE — TELEPHONE ENCOUNTER
Called patient to schedule 2021 Medicare annual px with Dr. Ricardo Chiu, but couldn't leave voicemail.

## 2021-06-28 ENCOUNTER — TELEPHONE (OUTPATIENT)
Dept: INTERNAL MEDICINE CLINIC | Facility: CLINIC | Age: 81
End: 2021-06-28

## 2021-06-28 NOTE — TELEPHONE ENCOUNTER
Called patient to schedule Medicare annual physical for the year of 2021. Patient did not answer, left voicemail to schedule with Dr. Jennifer Baca.

## 2021-07-08 ENCOUNTER — PATIENT MESSAGE (OUTPATIENT)
Dept: INTERNAL MEDICINE CLINIC | Facility: CLINIC | Age: 81
End: 2021-07-08

## 2021-07-08 RX ORDER — BENAZEPRIL HYDROCHLORIDE 40 MG/1
40 TABLET, FILM COATED ORAL DAILY
Qty: 90 TABLET | Refills: 1 | Status: SHIPPED | OUTPATIENT
Start: 2021-07-08 | End: 2021-10-22

## 2021-07-28 ENCOUNTER — HOSPITAL ENCOUNTER (OUTPATIENT)
Dept: GENERAL RADIOLOGY | Age: 81
Discharge: HOME | End: 2021-07-28
Attending: INTERNAL MEDICINE
Payer: MEDICARE

## 2021-07-28 ENCOUNTER — OFFICE VISIT (OUTPATIENT)
Dept: INTERNAL MEDICINE CLINIC | Facility: CLINIC | Age: 81
End: 2021-07-28
Payer: MEDICARE

## 2021-07-28 ENCOUNTER — LAB ENCOUNTER (OUTPATIENT)
Dept: LAB | Age: 81
End: 2021-07-28
Attending: INTERNAL MEDICINE
Payer: MEDICARE

## 2021-07-28 VITALS
HEART RATE: 98 BPM | SYSTOLIC BLOOD PRESSURE: 139 MMHG | RESPIRATION RATE: 17 BRPM | DIASTOLIC BLOOD PRESSURE: 68 MMHG | WEIGHT: 144.25 LBS | HEIGHT: 63 IN | BODY MASS INDEX: 25.56 KG/M2

## 2021-07-28 DIAGNOSIS — Z00.00 PHYSICAL EXAM, ANNUAL: Primary | ICD-10-CM

## 2021-07-28 DIAGNOSIS — H91.93 DECREASED HEARING OF BOTH EARS: ICD-10-CM

## 2021-07-28 DIAGNOSIS — R06.00 DYSPNEA ON EXERTION: ICD-10-CM

## 2021-07-28 DIAGNOSIS — I10 ESSENTIAL HYPERTENSION: ICD-10-CM

## 2021-07-28 DIAGNOSIS — I77.1 TORTUOUS AORTA (HCC): ICD-10-CM

## 2021-07-28 DIAGNOSIS — R53.83 OTHER FATIGUE: ICD-10-CM

## 2021-07-28 DIAGNOSIS — Z86.69 HISTORY OF GLAUCOMA: ICD-10-CM

## 2021-07-28 DIAGNOSIS — Z86.16 HISTORY OF COVID-19: ICD-10-CM

## 2021-07-28 DIAGNOSIS — R73.9 HYPERGLYCEMIA: ICD-10-CM

## 2021-07-28 DIAGNOSIS — M81.0 AGE-RELATED OSTEOPOROSIS WITHOUT CURRENT PATHOLOGICAL FRACTURE: ICD-10-CM

## 2021-07-28 LAB
ALBUMIN SERPL-MCNC: 3.9 G/DL (ref 3.4–5)
ALBUMIN/GLOB SERPL: 1 {RATIO} (ref 1–2)
ALP LIVER SERPL-CCNC: 92 U/L
ALT SERPL-CCNC: 30 U/L
ANION GAP SERPL CALC-SCNC: 5 MMOL/L (ref 0–18)
AST SERPL-CCNC: 27 U/L (ref 15–37)
BASOPHILS # BLD AUTO: 0.07 X10(3) UL (ref 0–0.2)
BASOPHILS NFR BLD AUTO: 0.8 %
BILIRUB SERPL-MCNC: 0.3 MG/DL (ref 0.1–2)
BUN BLD-MCNC: 17 MG/DL (ref 7–18)
BUN/CREAT SERPL: 20 (ref 10–20)
CALCIUM BLD-MCNC: 9.2 MG/DL (ref 8.5–10.1)
CHLORIDE SERPL-SCNC: 104 MMOL/L (ref 98–112)
CO2 SERPL-SCNC: 29 MMOL/L (ref 21–32)
CREAT BLD-MCNC: 0.85 MG/DL
DEPRECATED RDW RBC AUTO: 45.3 FL (ref 35.1–46.3)
EOSINOPHIL # BLD AUTO: 0.3 X10(3) UL (ref 0–0.7)
EOSINOPHIL NFR BLD AUTO: 3.3 %
ERYTHROCYTE [DISTWIDTH] IN BLOOD BY AUTOMATED COUNT: 13.3 % (ref 11–15)
EST. AVERAGE GLUCOSE BLD GHB EST-MCNC: 120 MG/DL (ref 68–126)
GLOBULIN PLAS-MCNC: 3.8 G/DL (ref 2.8–4.4)
GLUCOSE BLD-MCNC: 99 MG/DL (ref 70–99)
HBA1C MFR BLD HPLC: 5.8 % (ref ?–5.7)
HCT VFR BLD AUTO: 42.4 %
HGB BLD-MCNC: 13.8 G/DL
IMM GRANULOCYTES # BLD AUTO: 0.03 X10(3) UL (ref 0–1)
IMM GRANULOCYTES NFR BLD: 0.3 %
LYMPHOCYTES # BLD AUTO: 1.6 X10(3) UL (ref 1–4)
LYMPHOCYTES NFR BLD AUTO: 17.8 %
M PROTEIN MFR SERPL ELPH: 7.7 G/DL (ref 6.4–8.2)
MCH RBC QN AUTO: 30.3 PG (ref 26–34)
MCHC RBC AUTO-ENTMCNC: 32.5 G/DL (ref 31–37)
MCV RBC AUTO: 93.2 FL
MONOCYTES # BLD AUTO: 1.1 X10(3) UL (ref 0.1–1)
MONOCYTES NFR BLD AUTO: 12.3 %
NEUTROPHILS # BLD AUTO: 5.87 X10 (3) UL (ref 1.5–7.7)
NEUTROPHILS # BLD AUTO: 5.87 X10(3) UL (ref 1.5–7.7)
NEUTROPHILS NFR BLD AUTO: 65.5 %
OSMOLALITY SERPL CALC.SUM OF ELEC: 288 MOSM/KG (ref 275–295)
PATIENT FASTING Y/N/NP: NO
PLATELET # BLD AUTO: 241 10(3)UL (ref 150–450)
POTASSIUM SERPL-SCNC: 3.7 MMOL/L (ref 3.5–5.1)
RBC # BLD AUTO: 4.55 X10(6)UL
SODIUM SERPL-SCNC: 138 MMOL/L (ref 136–145)
T3FREE SERPL-MCNC: 3.23 PG/ML (ref 2.4–4.2)
T4 FREE SERPL-MCNC: 1 NG/DL (ref 0.8–1.7)
TSI SER-ACNC: 0.25 MIU/ML (ref 0.36–3.74)
WBC # BLD AUTO: 9 X10(3) UL (ref 4–11)

## 2021-07-28 PROCEDURE — 83036 HEMOGLOBIN GLYCOSYLATED A1C: CPT

## 2021-07-28 PROCEDURE — 85025 COMPLETE CBC W/AUTO DIFF WBC: CPT

## 2021-07-28 PROCEDURE — G0439 PPPS, SUBSEQ VISIT: HCPCS | Performed by: INTERNAL MEDICINE

## 2021-07-28 PROCEDURE — 3078F DIAST BP <80 MM HG: CPT | Performed by: INTERNAL MEDICINE

## 2021-07-28 PROCEDURE — 96160 PT-FOCUSED HLTH RISK ASSMT: CPT | Performed by: INTERNAL MEDICINE

## 2021-07-28 PROCEDURE — 80053 COMPREHEN METABOLIC PANEL: CPT

## 2021-07-28 PROCEDURE — 84439 ASSAY OF FREE THYROXINE: CPT

## 2021-07-28 PROCEDURE — 84443 ASSAY THYROID STIM HORMONE: CPT

## 2021-07-28 PROCEDURE — 71046 X-RAY EXAM CHEST 2 VIEWS: CPT | Performed by: INTERNAL MEDICINE

## 2021-07-28 PROCEDURE — 84481 FREE ASSAY (FT-3): CPT

## 2021-07-28 PROCEDURE — 36415 COLL VENOUS BLD VENIPUNCTURE: CPT

## 2021-07-28 PROCEDURE — 3008F BODY MASS INDEX DOCD: CPT | Performed by: INTERNAL MEDICINE

## 2021-07-28 PROCEDURE — 3075F SYST BP GE 130 - 139MM HG: CPT | Performed by: INTERNAL MEDICINE

## 2021-07-28 PROCEDURE — 99397 PER PM REEVAL EST PAT 65+ YR: CPT | Performed by: INTERNAL MEDICINE

## 2021-07-28 RX ORDER — ALBUTEROL SULFATE 90 UG/1
2 AEROSOL, METERED RESPIRATORY (INHALATION) EVERY 4 HOURS PRN
Qty: 3 EACH | Refills: 0 | Status: SHIPPED | OUTPATIENT
Start: 2021-07-28

## 2021-08-03 ENCOUNTER — PATIENT MESSAGE (OUTPATIENT)
Dept: INTERNAL MEDICINE CLINIC | Facility: CLINIC | Age: 81
End: 2021-08-03

## 2021-08-03 NOTE — TELEPHONE ENCOUNTER
MyChart message sent,  Will postpone for a day and will re check the [employer]. Need clarification. From: Jamie Cagle  To:  Ricky Santos MD  Sent: 8/3/2021  9:01 AM CDT  Subject: Prescription Question    A prescription medicine for my portab

## 2021-08-05 ENCOUNTER — OFFICE VISIT (OUTPATIENT)
Dept: AUDIOLOGY | Facility: CLINIC | Age: 81
End: 2021-08-05
Payer: MEDICARE

## 2021-08-05 ENCOUNTER — OFFICE VISIT (OUTPATIENT)
Dept: OTOLARYNGOLOGY | Facility: CLINIC | Age: 81
End: 2021-08-05
Payer: MEDICARE

## 2021-08-05 VITALS — TEMPERATURE: 97 F | HEIGHT: 63 IN | WEIGHT: 139 LBS | BODY MASS INDEX: 24.63 KG/M2

## 2021-08-05 DIAGNOSIS — H90.3 SENSORINEURAL HEARING LOSS, BILATERAL: Primary | ICD-10-CM

## 2021-08-05 DIAGNOSIS — H90.3 SENSORINEURAL HEARING LOSS (SNHL) OF BOTH EARS: Primary | ICD-10-CM

## 2021-08-05 PROCEDURE — 92567 TYMPANOMETRY: CPT | Performed by: AUDIOLOGIST

## 2021-08-05 PROCEDURE — 92557 COMPREHENSIVE HEARING TEST: CPT | Performed by: AUDIOLOGIST

## 2021-08-05 PROCEDURE — 3008F BODY MASS INDEX DOCD: CPT | Performed by: OTOLARYNGOLOGY

## 2021-08-05 PROCEDURE — 99203 OFFICE O/P NEW LOW 30 MIN: CPT | Performed by: OTOLARYNGOLOGY

## 2021-08-05 NOTE — PATIENT INSTRUCTIONS
How Hearing Aids Can Help You    Losing your hearing can be frustrating. But hearing aids can help you hear what Fito Ranks been missing. Not everyone who has hearing loss needs hearing aids.  Hearing aids will most likely help you if your hearing loss:   · [facility]. All rights reserved. This information is not intended as a substitute for professional medical care. Always follow your healthcare professional's instructions.

## 2021-08-05 NOTE — PROGRESS NOTES
Beni Durán is a [de-identified]year old female.  Patient presents with:  Hearing Loss: Patient Presents with: Bilateral Hearing loss after testing postive for covid 3 months         HISTORY OF PRESENT ILLNESS  8/5/2021   Here for evaluation of bilateral hear Exercise per Week:       Minutes of Exercise per Session:   Stress:       Feeling of Stress :   Social Connections:       Frequency of Communication with Friends and Family:       Frequency of Social Gatherings with Friends and Family:       Attends Religi Left: Normal. Pupil - Right: Normal, Left: Normal. EOMI.  [place]   Ears abNormal  normal to inspection ear canals are nl tympanic membranes are nl     Audiogram was done today          Current Outpatient Medications:   •  albuterol sulfate (2.5 MG/3ML) 0.083% discrepancies may still exist  Emily Jose MD 8/5/2021 1:30 PM

## 2021-09-28 ENCOUNTER — OFFICE VISIT (OUTPATIENT)
Dept: PULMONOLOGY | Facility: CLINIC | Age: 81
End: 2021-09-28
Payer: MEDICARE

## 2021-09-28 ENCOUNTER — HOSPITAL ENCOUNTER (OUTPATIENT)
Dept: GENERAL RADIOLOGY | Age: 81
Discharge: HOME | End: 2021-09-28
Attending: INTERNAL MEDICINE
Payer: MEDICARE

## 2021-09-28 VITALS
HEART RATE: 73 BPM | HEIGHT: 63.5 IN | TEMPERATURE: 99 F | DIASTOLIC BLOOD PRESSURE: 79 MMHG | RESPIRATION RATE: 18 BRPM | WEIGHT: 138.81 LBS | OXYGEN SATURATION: 98 % | SYSTOLIC BLOOD PRESSURE: 152 MMHG | BODY MASS INDEX: 24.29 KG/M2

## 2021-09-28 DIAGNOSIS — U07.1 COVID: Primary | ICD-10-CM

## 2021-09-28 DIAGNOSIS — U07.1 COVID: ICD-10-CM

## 2021-09-28 PROCEDURE — 71046 X-RAY EXAM CHEST 2 VIEWS: CPT | Performed by: INTERNAL MEDICINE

## 2021-09-28 PROCEDURE — 99204 OFFICE O/P NEW MOD 45 MIN: CPT | Performed by: INTERNAL MEDICINE

## 2021-09-28 PROCEDURE — 3008F BODY MASS INDEX DOCD: CPT | Performed by: INTERNAL MEDICINE

## 2021-09-28 PROCEDURE — 3077F SYST BP >= 140 MM HG: CPT | Performed by: INTERNAL MEDICINE

## 2021-09-28 PROCEDURE — 3078F DIAST BP <80 MM HG: CPT | Performed by: INTERNAL MEDICINE

## 2021-09-28 NOTE — H&P
Referring Physician  Magdalena Valdovinos MD    Chief Complaint  Dyspnea    History of Present Illness  Patient is a 50-year-old female presents to pulmonary clinic for initial visit.   Admits to diagnosis of COVID-19 pneumonia in April 2021 requiring 2 weeks hosp Aero Soln, Inhale 2 puffs into the lungs every 4 (four) hours as needed for Wheezing., Disp: 3 each, Rfl: 0  Benazepril HCl 40 MG Oral Tab, Take 1 tablet (40 mg total) by mouth daily. , Disp: 90 tablet, Rfl: 1  amLODIPine Besylate 5 MG Oral Tab, Take 1 tabl If dyspnea continues may consider pulmonary function testing in near future.       Priyank Melgoza DO  Pulmonary Medicine  [facility]  9/28/2021  12:02 PM

## 2021-09-30 ENCOUNTER — TELEPHONE (OUTPATIENT)
Dept: PULMONOLOGY | Facility: CLINIC | Age: 81
End: 2021-09-30

## 2021-09-30 NOTE — TELEPHONE ENCOUNTER
Pt. Is returning the nurses call. Pt. States that she is hard of hearing and to please let the phone ring a lot, to give the pt enough time to hear the phone ringing.

## 2021-10-22 ENCOUNTER — OFFICE VISIT (OUTPATIENT)
Dept: INTERNAL MEDICINE CLINIC | Facility: CLINIC | Age: 81
End: 2021-10-22
Payer: MEDICARE

## 2021-10-22 ENCOUNTER — LAB ENCOUNTER (OUTPATIENT)
Dept: LAB | Age: 81
End: 2021-10-22
Attending: INTERNAL MEDICINE
Payer: MEDICARE

## 2021-10-22 VITALS
DIASTOLIC BLOOD PRESSURE: 73 MMHG | BODY MASS INDEX: 24.78 KG/M2 | HEART RATE: 82 BPM | RESPIRATION RATE: 22 BRPM | SYSTOLIC BLOOD PRESSURE: 130 MMHG | HEIGHT: 63.5 IN | WEIGHT: 141.63 LBS

## 2021-10-22 DIAGNOSIS — E05.90 HYPERTHYROIDISM: ICD-10-CM

## 2021-10-22 DIAGNOSIS — U09.9 POST-COVID SYNDROME: ICD-10-CM

## 2021-10-22 DIAGNOSIS — I10 PRIMARY HYPERTENSION: ICD-10-CM

## 2021-10-22 DIAGNOSIS — I10 PRIMARY HYPERTENSION: Primary | ICD-10-CM

## 2021-10-22 PROCEDURE — 99213 OFFICE O/P EST LOW 20 MIN: CPT | Performed by: INTERNAL MEDICINE

## 2021-10-22 PROCEDURE — 3008F BODY MASS INDEX DOCD: CPT | Performed by: INTERNAL MEDICINE

## 2021-10-22 PROCEDURE — 84443 ASSAY THYROID STIM HORMONE: CPT

## 2021-10-22 PROCEDURE — 3078F DIAST BP <80 MM HG: CPT | Performed by: INTERNAL MEDICINE

## 2021-10-22 PROCEDURE — 84439 ASSAY OF FREE THYROXINE: CPT

## 2021-10-22 PROCEDURE — 3075F SYST BP GE 130 - 139MM HG: CPT | Performed by: INTERNAL MEDICINE

## 2021-10-22 PROCEDURE — 80053 COMPREHEN METABOLIC PANEL: CPT

## 2021-10-22 PROCEDURE — 85025 COMPLETE CBC W/AUTO DIFF WBC: CPT

## 2021-10-22 PROCEDURE — 36415 COLL VENOUS BLD VENIPUNCTURE: CPT

## 2021-10-22 RX ORDER — VITAMIN E 268 MG
400 CAPSULE ORAL DAILY
COMMUNITY

## 2021-10-22 RX ORDER — HYDROCHLOROTHIAZIDE 12.5 MG/1
12.5 TABLET ORAL DAILY
Qty: 90 TABLET | Refills: 3 | Status: SHIPPED | OUTPATIENT
Start: 2021-10-22

## 2021-10-22 RX ORDER — BENAZEPRIL HYDROCHLORIDE 40 MG/1
40 TABLET, FILM COATED ORAL DAILY
Qty: 90 TABLET | Refills: 1 | Status: SHIPPED | OUTPATIENT
Start: 2021-10-22

## 2021-10-22 RX ORDER — AMLODIPINE BESYLATE 5 MG/1
5 TABLET ORAL DAILY
Qty: 90 TABLET | Refills: 3 | Status: SHIPPED | OUTPATIENT
Start: 2021-10-22

## 2021-10-23 NOTE — PROGRESS NOTES
Subjective:   Patient ID: Sonya Doctor is a [de-identified]year old female. Presents for follow-up on hypertension, Covid.     HPI  Patient suffered Covid 6 months ago in [facility] was very ill, states that slowly improving energy level is improving still f Exam  Constitutional:       General: She is not in acute distress. Appearance: She is not ill-appearing. HENT:      Head: Normocephalic and atraumatic. Eyes:      General: No scleral icterus. Extraocular Movements: Extraocular movements intact.

## 2021-12-08 ENCOUNTER — TELEPHONE (OUTPATIENT)
Dept: INTERNAL MEDICINE CLINIC | Facility: CLINIC | Age: 81
End: 2021-12-08

## 2021-12-08 NOTE — TELEPHONE ENCOUNTER
Received parking placard form and letter from patient via mail in [place]. Placed in [address] [place] [address] folder.

## 2022-01-12 ENCOUNTER — IMMUNIZATION (OUTPATIENT)
Dept: LAB | Facility: HOSPITAL | Age: 82
End: 2022-01-12
Attending: EMERGENCY MEDICINE
Payer: MEDICARE

## 2022-01-12 DIAGNOSIS — Z23 NEED FOR VACCINATION: Primary | ICD-10-CM

## 2022-01-12 PROCEDURE — 0051A SARSCOV2 VAC 30MCG/0.3ML IM: CPT

## 2022-01-12 PROCEDURE — 0001A SARSCOV2 VAC 30MCG/0.3ML IM: CPT

## 2022-02-10 ENCOUNTER — IMMUNIZATION (OUTPATIENT)
Dept: LAB | Age: 82
End: 2022-02-10
Attending: EMERGENCY MEDICINE
Payer: MEDICARE

## 2022-02-10 DIAGNOSIS — Z23 NEED FOR VACCINATION: Primary | ICD-10-CM

## 2022-02-10 PROCEDURE — 0052A SARSCOV2 VAC 30MCG TRS SUCR: CPT | Performed by: PHYSICIAN ASSISTANT

## 2022-03-01 ENCOUNTER — HOSPITAL ENCOUNTER (OUTPATIENT)
Dept: GENERAL RADIOLOGY | Age: 82
Discharge: HOME | End: 2022-03-01
Attending: INTERNAL MEDICINE
Payer: MEDICARE

## 2022-03-01 ENCOUNTER — OFFICE VISIT (OUTPATIENT)
Dept: PULMONOLOGY | Facility: CLINIC | Age: 82
End: 2022-03-01
Payer: MEDICARE

## 2022-03-01 VITALS
RESPIRATION RATE: 14 BRPM | DIASTOLIC BLOOD PRESSURE: 71 MMHG | HEIGHT: 63.5 IN | OXYGEN SATURATION: 98 % | SYSTOLIC BLOOD PRESSURE: 130 MMHG | BODY MASS INDEX: 24.67 KG/M2 | HEART RATE: 75 BPM | WEIGHT: 141 LBS

## 2022-03-01 DIAGNOSIS — R06.00 DYSPNEA, UNSPECIFIED TYPE: ICD-10-CM

## 2022-03-01 DIAGNOSIS — R06.00 DYSPNEA, UNSPECIFIED TYPE: Primary | ICD-10-CM

## 2022-03-01 PROBLEM — J44.9 CHRONIC OBSTRUCTIVE PULMONARY DISEASE, UNSPECIFIED (HCC): Status: ACTIVE | Noted: 2022-03-01

## 2022-03-01 PROCEDURE — 3078F DIAST BP <80 MM HG: CPT | Performed by: INTERNAL MEDICINE

## 2022-03-01 PROCEDURE — 99214 OFFICE O/P EST MOD 30 MIN: CPT | Performed by: INTERNAL MEDICINE

## 2022-03-01 PROCEDURE — 3075F SYST BP GE 130 - 139MM HG: CPT | Performed by: INTERNAL MEDICINE

## 2022-03-01 PROCEDURE — 3008F BODY MASS INDEX DOCD: CPT | Performed by: INTERNAL MEDICINE

## 2022-03-01 PROCEDURE — 71046 X-RAY EXAM CHEST 2 VIEWS: CPT | Performed by: INTERNAL MEDICINE

## 2022-03-01 RX ORDER — FLUTICASONE FUROATE AND VILANTEROL TRIFENATATE 100; 25 UG/1; UG/1
1 POWDER RESPIRATORY (INHALATION) DAILY
Qty: 1 EACH | Refills: 5 | Status: SHIPPED | OUTPATIENT
Start: 2022-03-01

## 2022-03-01 NOTE — PROGRESS NOTES
Referring Physician  Keren Mejia MD    History of Present Illness  Patient presents today for follow-up visit to pulmonary clinic. Admits to gradual improvement in dyspnea since last visit also states respiratory status still not at baseline. Discontinued use of albuterol inhaler. Some mild dyspnea with exertion. 12 point review of systems otherwise negative    Medications  Glucosamine-Chondroitin (MOVE FREE OR), Take 1 tablet by mouth daily. , Disp: , Rfl:   vitamin E 400 UNITS Oral Cap, Take 400 Units by mouth daily. , Disp: , Rfl:   Benazepril HCl 40 MG Oral Tab, Take 1 tablet (40 mg total) by mouth daily. , Disp: 90 tablet, Rfl: 1  amLODIPine 5 MG Oral Tab, Take 1 tablet (5 mg total) by mouth daily. , Disp: 90 tablet, Rfl: 3  hydrochlorothiazide 12.5 MG Oral Tab, Take 1 tablet (12.5 mg total) by mouth daily. , Disp: 90 tablet, Rfl: 3  Multiple Vitamins-Minerals (CENTRUM OR), Take by mouth., Disp: , Rfl:   Cholecalciferol (VITAMIN D3) 2000 units Oral Tab, To take OTC Vit D3 5000 u for 3 months than 2000 units. start on 4/19/17, Disp: 1 tablet, Rfl: 0  albuterol sulfate (2.5 MG/3ML) 0.083% Inhalation Nebu Soln, Take 3 mL (2.5 mg total) by nebulization every 4 (four) hours as needed for Wheezing. Dx N00.2,A33 bronchitis due to Covid (Patient not taking: Reported on 3/1/2022), Disp: 180 each, Rfl: 0  Albuterol (VENTOLIN IN), Inhale into the lungs every 4 (four) hours as needed. (Patient not taking: Reported on 3/1/2022), Disp: , Rfl:   Albuterol Sulfate HFA (VENTOLIN HFA) 108 (90 Base) MCG/ACT Inhalation Aero Soln, Inhale 2 puffs into the lungs every 4 (four) hours as needed for Wheezing. (Patient not taking: Reported on 3/1/2022), Disp: 3 each, Rfl: 0    No current facility-administered medications on file prior to visit.       Allergies    Flu Virus Vaccine       FATIGUE    Physical Exam  Constitutional: no acute distress  HEENT: PERRL  Neck: supple, no JVD  Cardio: RRR, S1 S2  Respiratory: clear to auscultation bilaterally, no wheezing, rales, rhonchi, crackles  GI: abdomen soft, non tender, active bowel sounds, no organomegaly  Extremities: no clubbing, cyanosis, edema  Neurologic: no gross motor deficits  Skin: warm, dry  Lymphatic: no supraclavicular lymphadenopathy     Assessment  1. Prior history of COVID-19 pneumonia  2. Dyspnea with exertion    Plan  -Patient presents today for follow-up visit to pulmonary clinic. History of COVID-19 pneumonia requiring hospitalization April 2021. States dyspnea gradually improving although not at baseline. Admits to some improvement while using albuterol. Will obtain baseline pulmonary function testing. Will prescribe ICS/LABA and monitor response. Will obtain repeat chest x-ray. Most recent chest x-ray from 9/28/2021 with some hyperinflation and scarring and atelectasis seen in bibasilar regions.     Stacey Mishra DO  Pulmonary Medicine  [facility]  3/1/2022  11:43 AM

## 2022-03-08 ENCOUNTER — LAB ENCOUNTER (OUTPATIENT)
Dept: LAB | Age: 82
End: 2022-03-08
Attending: INTERNAL MEDICINE
Payer: MEDICARE

## 2022-03-08 DIAGNOSIS — R06.00 DYSPNEA, UNSPECIFIED TYPE: ICD-10-CM

## 2022-03-08 LAB — SARS-COV-2 RNA RESP QL NAA+PROBE: NOT DETECTED

## 2022-03-11 ENCOUNTER — HOSPITAL ENCOUNTER (OUTPATIENT)
Dept: RESPIRATORY THERAPY | Facility: HOSPITAL | Age: 82
Discharge: HOME | End: 2022-03-11
Attending: INTERNAL MEDICINE
Payer: MEDICARE

## 2022-03-11 DIAGNOSIS — R06.00 DYSPNEA, UNSPECIFIED TYPE: ICD-10-CM

## 2022-03-11 PROCEDURE — 94729 DIFFUSING CAPACITY: CPT | Performed by: INTERNAL MEDICINE

## 2022-03-11 PROCEDURE — 94726 PLETHYSMOGRAPHY LUNG VOLUMES: CPT | Performed by: INTERNAL MEDICINE

## 2022-03-11 PROCEDURE — 94010 BREATHING CAPACITY TEST: CPT | Performed by: INTERNAL MEDICINE

## 2022-03-16 ENCOUNTER — OFFICE VISIT (OUTPATIENT)
Dept: INTERNAL MEDICINE CLINIC | Facility: CLINIC | Age: 82
End: 2022-03-16
Payer: MEDICARE

## 2022-03-16 VITALS
HEART RATE: 66 BPM | BODY MASS INDEX: 24.67 KG/M2 | DIASTOLIC BLOOD PRESSURE: 77 MMHG | WEIGHT: 141 LBS | HEIGHT: 63.5 IN | SYSTOLIC BLOOD PRESSURE: 137 MMHG

## 2022-03-16 DIAGNOSIS — H90.3 SENSORINEURAL HEARING LOSS, BILATERAL: ICD-10-CM

## 2022-03-16 DIAGNOSIS — I77.1 TORTUOUS AORTA (HCC): ICD-10-CM

## 2022-03-16 DIAGNOSIS — I10 PRIMARY HYPERTENSION: ICD-10-CM

## 2022-03-16 DIAGNOSIS — Z00.00 PHYSICAL EXAM, ANNUAL: Primary | ICD-10-CM

## 2022-03-16 DIAGNOSIS — Z86.69 HISTORY OF GLAUCOMA: ICD-10-CM

## 2022-03-16 DIAGNOSIS — E78.49 OTHER HYPERLIPIDEMIA: ICD-10-CM

## 2022-03-16 PROCEDURE — 3075F SYST BP GE 130 - 139MM HG: CPT | Performed by: INTERNAL MEDICINE

## 2022-03-16 PROCEDURE — 3008F BODY MASS INDEX DOCD: CPT | Performed by: INTERNAL MEDICINE

## 2022-03-16 PROCEDURE — 96160 PT-FOCUSED HLTH RISK ASSMT: CPT | Performed by: INTERNAL MEDICINE

## 2022-03-16 PROCEDURE — 3078F DIAST BP <80 MM HG: CPT | Performed by: INTERNAL MEDICINE

## 2022-03-16 PROCEDURE — 99397 PER PM REEVAL EST PAT 65+ YR: CPT | Performed by: INTERNAL MEDICINE

## 2022-03-16 PROCEDURE — G0439 PPPS, SUBSEQ VISIT: HCPCS | Performed by: INTERNAL MEDICINE

## 2022-03-16 NOTE — ADDENDUM NOTE
Encounter addended by: Brian Whaley MD on: 3/16/2022 11:50 AM   Actions taken: Clinical Note Signed, Charge Capture section accepted

## 2022-03-16 NOTE — PROCEDURES
Diana Ordaz [address]     1940 MRN [id number]   Height  61 inh  Age 81year old   Weight  141 lbs  Sex Female         Spirometry:   Normal spirometry / no evidence of obstructive disease . FVC 2/36 L 96 %   FEV1 2.03 L which is 110 %   FEV1/FVC ratio 86 % which is normal         FVL:   Normal flow volume loop      Lung Volume:   TLC 3.70 L 75 %   VC 2.36 L 94 %   Rv/TLC ratio is normal       DLCO:   58 % which is Moderately reduced       Impression:   Mild restrictive pattern with mild reduction in DLCO and clinical correlation is needed . Normal spirometry with no evidence of obstructive disease . Thank you for allowing me to participate in the care of your patient. Charla Zuñiga.  Dao Schmidt MD  3/16/2022  11:45 AM

## 2022-03-18 ENCOUNTER — LAB ENCOUNTER (OUTPATIENT)
Dept: LAB | Age: 82
End: 2022-03-18
Attending: INTERNAL MEDICINE
Payer: MEDICARE

## 2022-03-18 DIAGNOSIS — I10 PRIMARY HYPERTENSION: ICD-10-CM

## 2022-03-18 DIAGNOSIS — E78.49 OTHER HYPERLIPIDEMIA: ICD-10-CM

## 2022-03-18 LAB
ALBUMIN SERPL-MCNC: 3.9 G/DL (ref 3.4–5)
ALBUMIN/GLOB SERPL: 1.3 {RATIO} (ref 1–2)
ALP LIVER SERPL-CCNC: 81 U/L
ALT SERPL-CCNC: 30 U/L
ANION GAP SERPL CALC-SCNC: 5 MMOL/L (ref 0–18)
AST SERPL-CCNC: 26 U/L (ref 15–37)
BASOPHILS # BLD AUTO: 0.04 X10(3) UL (ref 0–0.2)
BASOPHILS NFR BLD AUTO: 0.6 %
BILIRUB SERPL-MCNC: 0.3 MG/DL (ref 0.1–2)
BUN BLD-MCNC: 15 MG/DL (ref 7–18)
BUN/CREAT SERPL: 16 (ref 10–20)
CALCIUM BLD-MCNC: 9.1 MG/DL (ref 8.5–10.1)
CHLORIDE SERPL-SCNC: 107 MMOL/L (ref 98–112)
CHOLEST SERPL-MCNC: 213 MG/DL (ref ?–200)
CO2 SERPL-SCNC: 27 MMOL/L (ref 21–32)
CREAT BLD-MCNC: 0.94 MG/DL
DEPRECATED RDW RBC AUTO: 46.8 FL (ref 35.1–46.3)
EOSINOPHIL # BLD AUTO: 0.09 X10(3) UL (ref 0–0.7)
EOSINOPHIL NFR BLD AUTO: 1.4 %
ERYTHROCYTE [DISTWIDTH] IN BLOOD BY AUTOMATED COUNT: 13.9 % (ref 11–15)
FASTING PATIENT LIPID ANSWER: YES
FASTING STATUS PATIENT QL REPORTED: YES
GLOBULIN PLAS-MCNC: 3 G/DL (ref 2.8–4.4)
GLUCOSE BLD-MCNC: 98 MG/DL (ref 70–99)
HCT VFR BLD AUTO: 43.5 %
HDLC SERPL-MCNC: 52 MG/DL (ref 40–59)
HGB BLD-MCNC: 14.6 G/DL
IMM GRANULOCYTES # BLD AUTO: 0.03 X10(3) UL (ref 0–1)
IMM GRANULOCYTES NFR BLD: 0.5 %
LDLC SERPL CALC-MCNC: 135 MG/DL (ref ?–100)
LYMPHOCYTES # BLD AUTO: 1.96 X10(3) UL (ref 1–4)
LYMPHOCYTES NFR BLD AUTO: 31.2 %
MCH RBC QN AUTO: 30.5 PG (ref 26–34)
MCHC RBC AUTO-ENTMCNC: 33.6 G/DL (ref 31–37)
MCV RBC AUTO: 91 FL
MONOCYTES # BLD AUTO: 0.48 X10(3) UL (ref 0.1–1)
MONOCYTES NFR BLD AUTO: 7.6 %
NEUTROPHILS # BLD AUTO: 3.68 X10 (3) UL (ref 1.5–7.7)
NEUTROPHILS # BLD AUTO: 3.68 X10(3) UL (ref 1.5–7.7)
NEUTROPHILS NFR BLD AUTO: 58.7 %
NONHDLC SERPL-MCNC: 161 MG/DL (ref ?–130)
OSMOLALITY SERPL CALC.SUM OF ELEC: 289 MOSM/KG (ref 275–295)
PLATELET # BLD AUTO: 254 10(3)UL (ref 150–450)
POTASSIUM SERPL-SCNC: 4.2 MMOL/L (ref 3.5–5.1)
PROT SERPL-MCNC: 6.9 G/DL (ref 6.4–8.2)
RBC # BLD AUTO: 4.78 X10(6)UL
SODIUM SERPL-SCNC: 139 MMOL/L (ref 136–145)
TRIGL SERPL-MCNC: 143 MG/DL (ref 30–149)
VLDLC SERPL CALC-MCNC: 26 MG/DL (ref 0–30)
WBC # BLD AUTO: 6.3 X10(3) UL (ref 4–11)

## 2022-03-18 PROCEDURE — 80053 COMPREHEN METABOLIC PANEL: CPT

## 2022-03-18 PROCEDURE — 80061 LIPID PANEL: CPT

## 2022-03-18 PROCEDURE — 85025 COMPLETE CBC W/AUTO DIFF WBC: CPT

## 2022-03-18 PROCEDURE — 36415 COLL VENOUS BLD VENIPUNCTURE: CPT

## 2022-04-05 ENCOUNTER — MED REC SCAN ONLY (OUTPATIENT)
Dept: INTERNAL MEDICINE CLINIC | Facility: CLINIC | Age: 82
End: 2022-04-05

## 2022-04-22 RX ORDER — BENAZEPRIL HYDROCHLORIDE 40 MG/1
40 TABLET, FILM COATED ORAL DAILY
Qty: 90 TABLET | Refills: 1 | Status: SHIPPED | OUTPATIENT
Start: 2022-04-22 | End: 2022-09-20

## 2022-04-22 NOTE — TELEPHONE ENCOUNTER
Refill passed per [place] [employer] protocol.     Requested Prescriptions   Pending Prescriptions Disp Refills    BENAZEPRIL HCL 40 MG Oral Tab [Pharmacy Med Name: BENAZEPRIL HCL 40 MG Tablet] 90 tablet 1     Sig: TAKE 1 TABLET EVERY DAY        Hypertensive Medications Protocol Passed - 4/22/2022  4:31 AM        Passed - CMP or BMP in past 12 months        Passed - Appointment in past 6 or next 3 months        Passed - GFR Non- > 50     Lab Results   Component Value Date    GFRNAA 62 (L) 03/18/2022                           Recent Outpatient Visits              1 month ago Physical exam, annual    S[facility] Gre[facility] Haig Engman, MD    Office Visit    1 month ago Dyspnea, unspecified type    April Minoo, [address] [place] [address]    Office Visit    6 months ago Primary hypertension    [facility] Haig Engman, MD    Office Visit    6 months ago [address], [address] [facility] [place], [place], [place]    Office Visit    8 months ago Sensorineural hearing loss, bilateral    [facility] Jaison Falcon    Office Visit

## 2022-09-15 RX ORDER — AMLODIPINE BESYLATE 5 MG/1
5 TABLET ORAL DAILY
Qty: 90 TABLET | Refills: 0 | Status: SHIPPED | OUTPATIENT
Start: 2022-09-15 | End: 2023-02-13

## 2022-09-15 NOTE — TELEPHONE ENCOUNTER
Refill passed per [facility] protocol. CSS, please assist patient with scheduling follow-up appointment. 90 day supply until office visit. [employer] message sent.       Requested Prescriptions   Pending Prescriptions Disp Refills    AMLODIPINE 5 MG Oral Tab [Pharmacy Med Name: AMLODIPINE BESYLATE 5 MG Tablet] 90 tablet 3     Sig: TAKE 1 TABLET EVERY DAY        Hypertensive Medications Protocol Failed - 9/15/2022  4:05 AM        Failed - In person appointment or virtual visit in the past 6 months       Recent Outpatient Visits              6 months ago [facility] exam, [facility]    [id number] [place]    Office Visit    6 months ago Dyspnea, unspecified type    Ra Gell, [address] [place] [address]    Office Visit    10 months ago Primary hypertension    [id number] [facility], [facility]    Office Visit    11 months ago [address], [address] [place] [address]    Office Visit    1 year ago Sensorineural hearing loss, bilateral    [facility] Jaison Mckay    Office Visit                 Passed - In person appointment in the past 12 or next 3 months       Recent Outpatient Visits              6 months ago Physical exam, annual    [address] [facility] [place] [place]    Office Visit    6 months ago Dyspnea, unspecified type    Ra Gell, [address] [place] [address]    Office Visit    10 months ago Primary hypertension    [facility]iles January, 12 [facility] Evelyn Reeves MD    Office Visit    11 months ago [address], [address] [place], [place]    Office Visit    1 year ago Sensorineural hearing loss, bilateral    [facility] Eden Narvaez Jaison SANCHEZ    Office Visit                 Passed - Last BP reading less than 140/90     BP Readings from Last 1 Encounters:  03/16/22 : 137/77                Passed - CMP or BMP in past 6 months     Recent Results (from the past 4392 hour(s))   COMP METABOLIC PANEL (14)    Collection Time: 03/18/22  7:54 AM   Result Value Ref Range    Glucose 98 70 - 99 mg/dL    Sodium 139 136 - 145 mmol/L    Potassium 4.2 3.5 - 5.1 mmol/L    Chloride 107 98 - 112 mmol/L    CO2 27.0 21.0 - 32.0 mmol/L    Anion Gap 5 0 - 18 mmol/L    BUN 15 7 - 18 mg/dL    Creatinine 0.94 0.55 - 1.02 mg/dL    BUN/CREA Ratio 16.0 10.0 - 20.0    Calcium, Total 9.1 8.5 - 10.1 mg/dL    Calculated Osmolality 289 275 - 295 mOsm/kg    GFR, Non- 57 (L) >=60    GFR, -American 66 >=60    ALT 30 13 - 56 U/L    AST 26 15 - 37 U/L    Alkaline Phosphatase 81 55 - 142 U/L    Bilirubin, Total 0.3 0.1 - 2.0 mg/dL    Total Protein 6.9 6.4 - 8.2 g/dL    Albumin 3.9 3.4 - 5.0 g/dL    Globulin  3.0 2.8 - 4.4 g/dL    A/G Ratio 1.3 1.0 - 2.0    Patient Fasting for CMP? Yes      *Note: Due to a large number of results and/or encounters for the requested time period, some results have not been displayed. A complete set of results can be found in Results Review.                  Passed - EGFRCR or GFRNAA > 50     GFR Evaluation  GFRNAA: 57 , resulted on 3/18/2022                  Recent Outpatient Visits              6 months ago Physical exam, annual    Aliza Childress MD    Office Visit    6 months ago Dyspnea, unspecified type    Charis, [address] [place] [address], [place], [place]    Office Visit    10 months ago Primary hypertension    Beth Cramer, Mi Cline MD    Office Visit    11 months ago [address], [address] [facility], 100 [place], [place]    Office Visit    1 year ago Sensorineural hearing loss, bilateral    [facility] Jaison Brar    Office Visit

## 2022-09-17 ENCOUNTER — TELEPHONE (OUTPATIENT)
Dept: INTERNAL MEDICINE CLINIC | Facility: CLINIC | Age: 82
End: 2022-09-17

## 2022-09-20 RX ORDER — BENAZEPRIL HYDROCHLORIDE 40 MG/1
40 TABLET, FILM COATED ORAL DAILY
Qty: 90 TABLET | Refills: 0 | Status: SHIPPED | OUTPATIENT
Start: 2022-09-20

## 2022-09-20 NOTE — TELEPHONE ENCOUNTER
Please assist patient with making an appointment to receive further refills. Thank you. Refill passed per [facility] protocol.    Requested Prescriptions   Pending Prescriptions Disp Refills    BENAZEPRIL HCL 40 MG Oral Tab [Pharmacy Med Name: BENAZEPRIL HCL 40 MG Tablet] 90 tablet 1     Sig: TAKE 1 TABLET EVERY DAY        Hypertensive Medications Protocol Failed - 9/17/2022  2:47 AM        Failed - In person appointment or virtual visit in the past 6 months       Recent Outpatient Visits              6 months ago Physical exam, annual    [address], Corrinne Smack, [place], [place]    Office Visit    6 months ago Dyspnea, unspecified type    [facility], [address] [place] [address]    Office Visit    11 months ago Primary hypertension    [place] [facility] [place], [address] [place] [address], Corrinne Smack, [place], [place]    Office Visit    11 months ago [address], [address] [place] [address]    Office Visit    1 year ago Sensorineural hearing loss, bilateral    [facility] Jaison Cr    Office Visit     Future Appointments         Provider Department Appt Notes    In 3 weeks Mary Mendoza MD [place] [employer] [facility] [place], [address], [place] refill for medication; (policy informed)               Passed - In person appointment in the past 12 or next 3 months       Recent Outpatient Visits              6 months ago Physical exam, annual    [address], Corrinne Smack, [place], [place]    Office Visit    6 months ago Dyspnea, unspecified type    [facility], [address] [place] [address]    Office Visit    11 months ago Primary hypertension    [address], Corrinne Smack, [place], [place]    Office Visit    11 months ago [address], [facility]    Office Visit    1 year ago Sensorineural hearing loss, bilateral    [facility] Jaison Strickland    Office Visit     Future Appointments         Provider Department Appt Notes    In 3 weeks Alfred Poe MD [place] [employer] [facility], [address], [place] refill for medication; (policy informed)               Passed - Last BP reading less than 140/90     BP Readings from Last 1 Encounters:  03/16/22 : 137/77                Passed - CMP or BMP in past 6 months     No results found for this or any previous visit (from the past 4392 hour(s)).               Passed - EGFRCR or GFRNAA > 50     GFR Evaluation  GFRNAA: 57 , resulted on 3/18/2022                   Recent Outpatient Visits              6 months ago [facility] exam, annual    [address] [facility]    Office Visit    6 months ago [facility], unspecified type    [facility], [place], [place]    Office Visit    11 months ago Primary hypertension    [address] [facility] [place] [place]    Office Visit    11 months ago [address], 602 [facility], [address] [place] [address]    Office Visit    1 year ago Sensorineural hearing loss, bilateral    [facility] Jaison Strickland    Office Visit            Future Appointments         Provider Department Appt Notes    In 3 weeks Alfred Poe MD [facility], [address], [place] refill for medication; (policy informed)

## 2022-10-04 ENCOUNTER — OFFICE VISIT (OUTPATIENT)
Dept: INTERNAL MEDICINE CLINIC | Facility: CLINIC | Age: 82
End: 2022-10-04
Payer: MEDICARE

## 2022-10-04 VITALS
DIASTOLIC BLOOD PRESSURE: 66 MMHG | HEART RATE: 80 BPM | BODY MASS INDEX: 23.62 KG/M2 | HEIGHT: 63.5 IN | WEIGHT: 135 LBS | SYSTOLIC BLOOD PRESSURE: 137 MMHG | OXYGEN SATURATION: 96 %

## 2022-10-04 DIAGNOSIS — I10 PRIMARY HYPERTENSION: Primary | ICD-10-CM

## 2022-10-04 PROCEDURE — 3075F SYST BP GE 130 - 139MM HG: CPT | Performed by: INTERNAL MEDICINE

## 2022-10-04 PROCEDURE — 99212 OFFICE O/P EST SF 10 MIN: CPT | Performed by: INTERNAL MEDICINE

## 2022-10-04 PROCEDURE — 1126F AMNT PAIN NOTED NONE PRSNT: CPT | Performed by: INTERNAL MEDICINE

## 2022-10-04 PROCEDURE — 3078F DIAST BP <80 MM HG: CPT | Performed by: INTERNAL MEDICINE

## 2022-10-04 PROCEDURE — 3008F BODY MASS INDEX DOCD: CPT | Performed by: INTERNAL MEDICINE

## 2022-10-07 ENCOUNTER — LAB ENCOUNTER (OUTPATIENT)
Dept: LAB | Age: 82
End: 2022-10-07
Attending: INTERNAL MEDICINE
Payer: MEDICARE

## 2022-10-07 DIAGNOSIS — I10 PRIMARY HYPERTENSION: ICD-10-CM

## 2022-10-07 LAB
ALBUMIN SERPL-MCNC: 3.6 G/DL (ref 3.4–5)
ALBUMIN/GLOB SERPL: 1 {RATIO} (ref 1–2)
ALP LIVER SERPL-CCNC: 85 U/L
ALT SERPL-CCNC: 28 U/L
ANION GAP SERPL CALC-SCNC: 4 MMOL/L (ref 0–18)
AST SERPL-CCNC: 24 U/L (ref 15–37)
BASOPHILS # BLD AUTO: 0.04 X10(3) UL (ref 0–0.2)
BASOPHILS NFR BLD AUTO: 0.5 %
BILIRUB SERPL-MCNC: 0.3 MG/DL (ref 0.1–2)
BUN BLD-MCNC: 19 MG/DL (ref 7–18)
BUN/CREAT SERPL: 18.4 (ref 10–20)
CALCIUM BLD-MCNC: 8.8 MG/DL (ref 8.5–10.1)
CHLORIDE SERPL-SCNC: 106 MMOL/L (ref 98–112)
CO2 SERPL-SCNC: 29 MMOL/L (ref 21–32)
CREAT BLD-MCNC: 1.03 MG/DL
DEPRECATED RDW RBC AUTO: 48.1 FL (ref 35.1–46.3)
EOSINOPHIL # BLD AUTO: 0.1 X10(3) UL (ref 0–0.7)
EOSINOPHIL NFR BLD AUTO: 1.2 %
ERYTHROCYTE [DISTWIDTH] IN BLOOD BY AUTOMATED COUNT: 14 % (ref 11–15)
FASTING STATUS PATIENT QL REPORTED: NO
GFR SERPLBLD BASED ON 1.73 SQ M-ARVRAT: 55 ML/MIN/1.73M2 (ref 60–?)
GLOBULIN PLAS-MCNC: 3.7 G/DL (ref 2.8–4.4)
GLUCOSE BLD-MCNC: 109 MG/DL (ref 70–99)
HCT VFR BLD AUTO: 42.3 %
HGB BLD-MCNC: 13.8 G/DL
IMM GRANULOCYTES # BLD AUTO: 0.02 X10(3) UL (ref 0–1)
IMM GRANULOCYTES NFR BLD: 0.2 %
LYMPHOCYTES # BLD AUTO: 1.98 X10(3) UL (ref 1–4)
LYMPHOCYTES NFR BLD AUTO: 23.2 %
MCH RBC QN AUTO: 30.6 PG (ref 26–34)
MCHC RBC AUTO-ENTMCNC: 32.6 G/DL (ref 31–37)
MCV RBC AUTO: 93.8 FL
MONOCYTES # BLD AUTO: 0.83 X10(3) UL (ref 0.1–1)
MONOCYTES NFR BLD AUTO: 9.7 %
NEUTROPHILS # BLD AUTO: 5.57 X10 (3) UL (ref 1.5–7.7)
NEUTROPHILS # BLD AUTO: 5.57 X10(3) UL (ref 1.5–7.7)
NEUTROPHILS NFR BLD AUTO: 65.2 %
OSMOLALITY SERPL CALC.SUM OF ELEC: 291 MOSM/KG (ref 275–295)
PLATELET # BLD AUTO: 237 10(3)UL (ref 150–450)
POTASSIUM SERPL-SCNC: 3.7 MMOL/L (ref 3.5–5.1)
PROT SERPL-MCNC: 7.3 G/DL (ref 6.4–8.2)
RBC # BLD AUTO: 4.51 X10(6)UL
SODIUM SERPL-SCNC: 139 MMOL/L (ref 136–145)
WBC # BLD AUTO: 8.5 X10(3) UL (ref 4–11)

## 2022-10-07 PROCEDURE — 36415 COLL VENOUS BLD VENIPUNCTURE: CPT

## 2022-10-07 PROCEDURE — 85025 COMPLETE CBC W/AUTO DIFF WBC: CPT

## 2022-10-07 PROCEDURE — 80053 COMPREHEN METABOLIC PANEL: CPT

## 2022-10-30 RX ORDER — HYDROCHLOROTHIAZIDE 12.5 MG/1
12.5 TABLET ORAL DAILY
Qty: 90 TABLET | Refills: 1 | Status: SHIPPED | OUTPATIENT
Start: 2022-10-30

## 2022-11-28 ENCOUNTER — TELEPHONE (OUTPATIENT)
Dept: INTERNAL MEDICINE CLINIC | Facility: CLINIC | Age: 82
End: 2022-11-28

## 2022-11-28 NOTE — TELEPHONE ENCOUNTER
Patient is eligible for a 2023 Medicare Annual Wellness visit. This visit can be scheduled any time in the calendar year. Left message to call back.

## 2022-12-01 RX ORDER — BENAZEPRIL HYDROCHLORIDE 40 MG/1
40 TABLET, FILM COATED ORAL DAILY
Qty: 90 TABLET | Refills: 1 | Status: SHIPPED | OUTPATIENT
Start: 2022-12-01

## 2022-12-01 NOTE — TELEPHONE ENCOUNTER
Refill passed per [facility] protocol.   Requested Prescriptions   Pending Prescriptions Disp Refills    BENAZEPRIL HCL 40 MG Oral Tab [Pharmacy Med Name: BENAZEPRIL HCL 40 MG Tablet] 90 tablet 0     Sig: TAKE 1 TABLET EVERY DAY (NEED MD APPOINTMENT FOR REFILLS)       Hypertensive Medications Protocol Passed - 12/1/2022  2:31 AM        Passed - In person appointment in the past 12 or next 3 months     Recent Outpatient Visits              1 month ago Primary hypertension    [facility], [address] [place] [address], Evelyn Bourgeois MD    Office Visit    8 months ago Physical exam, annual    [address] [facility], Evelyn Bourgeois MD    Office Visit    9 months ago Dyspnea, unspecified type    [facility], [address] [place], [place]    Office Visit    1 year ago Primary hypertension    [facility] [place] [facility], [address] [place] [address], Evelyn Bourgeois MD    Office Visit    1 year ago [address], [address] [place], [place]    Office Visit          Future Appointments         Provider Department Appt Notes    In 1 month Drayl Rowe MD [facility], Main [address], [place]               Passed - Last BP reading less than 140/90     BP Readings from Last 1 Encounters:  10/04/22 : 137/66              Passed - CMP or BMP in past 6 months     Recent Results (from the past 4392 hour(s))   COMP METABOLIC PANEL (14)    Collection Time: 10/07/22  4:20 PM   Result Value Ref Range    Glucose 109 (H) 70 - 99 mg/dL    Sodium 139 136 - 145 mmol/L    Potassium 3.7 3.5 - 5.1 mmol/L    Chloride 106 98 - 112 mmol/L    CO2 29.0 21.0 - 32.0 mmol/L    Anion Gap 4 0 - 18 mmol/L    BUN 19 (H) 7 - 18 mg/dL    Creatinine 1.03 (H) 0.55 - 1.02 mg/dL    BUN/CREA Ratio 18.4 10.0 - 20.0    Calcium, Total 8.8 8.5 - 10.1 mg/dL    Calculated Osmolality 291 275 - 295 mOsm/kg    eGFR-Cr 55 (L) >=60 mL/min/1.73m2    ALT 28 13 - 56 U/L AST 24 15 - 37 U/L    Alkaline Phosphatase 85 55 - 142 U/L    Bilirubin, Total 0.3 0.1 - 2.0 mg/dL    Total Protein 7.3 6.4 - 8.2 g/dL    Albumin 3.6 3.4 - 5.0 g/dL    Globulin  3.7 2.8 - 4.4 g/dL    A/G Ratio 1.0 1.0 - 2.0    Patient Fasting for CMP? No      *Note: Due to a large number of results and/or encounters for the requested time period, some results have not been displayed. A complete set of results can be found in Results Review.                Passed - In person appointment or virtual visit in the past 6 months     Recent Outpatient Visits              1 month ago Primary hypertension    Yanira Sotomayor [place], MD    Office Visit    8 months ago Physical exam, annual    Christina Sotomayor MD    Office Visit    9 months ago Dyspnea, unspecified type    [facility], [address] [place] [address]    Office Visit    1 year ago Primary hypertension    Brandy Shaver, [place] Margaree Ba, MD    Office Visit    1 year ago [address] [facility], 602 [place], [place]    Office Visit          Future Appointments         Provider Department Appt Notes    In 1 month Cira Suresh MD [facility], [address], [place] Passed - [facility] or GFRNAA > 50     GFR Evaluation  EGFRCR: 54 , resulted on 10/7/2022

## 2023-01-16 ENCOUNTER — EKG ENCOUNTER (OUTPATIENT)
Dept: LAB | Age: 83
End: 2023-01-16
Attending: INTERNAL MEDICINE
Payer: MEDICARE

## 2023-01-16 ENCOUNTER — OFFICE VISIT (OUTPATIENT)
Dept: INTERNAL MEDICINE CLINIC | Facility: CLINIC | Age: 83
End: 2023-01-16

## 2023-01-16 VITALS
HEART RATE: 81 BPM | SYSTOLIC BLOOD PRESSURE: 144 MMHG | BODY MASS INDEX: 23.8 KG/M2 | DIASTOLIC BLOOD PRESSURE: 83 MMHG | OXYGEN SATURATION: 97 % | HEIGHT: 63.5 IN | WEIGHT: 136 LBS

## 2023-01-16 DIAGNOSIS — Z86.69 HISTORY OF GLAUCOMA: ICD-10-CM

## 2023-01-16 DIAGNOSIS — I10 PRIMARY HYPERTENSION: ICD-10-CM

## 2023-01-16 DIAGNOSIS — R06.09 DYSPNEA ON EXERTION: ICD-10-CM

## 2023-01-16 DIAGNOSIS — Z00.00 MEDICARE ANNUAL WELLNESS VISIT, SUBSEQUENT: Primary | ICD-10-CM

## 2023-01-16 DIAGNOSIS — E78.5 HYPERLIPIDEMIA LDL GOAL <100: ICD-10-CM

## 2023-01-16 DIAGNOSIS — H04.203 EYE TEARING, BILATERAL: ICD-10-CM

## 2023-01-16 DIAGNOSIS — R07.2 PRECORDIAL PAIN: ICD-10-CM

## 2023-01-16 DIAGNOSIS — H90.3 SENSORINEURAL HEARING LOSS, BILATERAL: ICD-10-CM

## 2023-01-16 DIAGNOSIS — I77.1 TORTUOUS AORTA (HCC): ICD-10-CM

## 2023-01-16 LAB
ATRIAL RATE: 69 BPM
P AXIS: 58 DEGREES
P-R INTERVAL: 146 MS
Q-T INTERVAL: 392 MS
QRS DURATION: 86 MS
QTC CALCULATION (BEZET): 420 MS
R AXIS: 64 DEGREES
T AXIS: 54 DEGREES
VENTRICULAR RATE: 69 BPM

## 2023-01-16 PROCEDURE — 93005 ELECTROCARDIOGRAM TRACING: CPT

## 2023-01-16 PROCEDURE — 93010 ELECTROCARDIOGRAM REPORT: CPT | Performed by: STUDENT IN AN ORGANIZED HEALTH CARE EDUCATION/TRAINING PROGRAM

## 2023-01-17 PROBLEM — J44.9 CHRONIC OBSTRUCTIVE PULMONARY DISEASE, UNSPECIFIED (HCC): Status: RESOLVED | Noted: 2022-03-01 | Resolved: 2023-01-17

## 2023-02-06 ENCOUNTER — LAB ENCOUNTER (OUTPATIENT)
Dept: LAB | Age: 83
End: 2023-02-06
Attending: INTERNAL MEDICINE
Payer: MEDICARE

## 2023-02-06 DIAGNOSIS — R89.9 ABNORMAL LABORATORY TEST RESULT: ICD-10-CM

## 2023-02-06 LAB
ANION GAP SERPL CALC-SCNC: 3 MMOL/L (ref 0–18)
BUN BLD-MCNC: 17 MG/DL (ref 7–18)
BUN/CREAT SERPL: 20 (ref 10–20)
CALCIUM BLD-MCNC: 9.2 MG/DL (ref 8.5–10.1)
CHLORIDE SERPL-SCNC: 106 MMOL/L (ref 98–112)
CO2 SERPL-SCNC: 31 MMOL/L (ref 21–32)
CREAT BLD-MCNC: 0.85 MG/DL
FASTING STATUS PATIENT QL REPORTED: YES
GFR SERPLBLD BASED ON 1.73 SQ M-ARVRAT: 69 ML/MIN/1.73M2 (ref 60–?)
GLUCOSE BLD-MCNC: 105 MG/DL (ref 70–99)
OSMOLALITY SERPL CALC.SUM OF ELEC: 292 MOSM/KG (ref 275–295)
POTASSIUM SERPL-SCNC: 3.9 MMOL/L (ref 3.5–5.1)
SODIUM SERPL-SCNC: 140 MMOL/L (ref 136–145)

## 2023-02-06 PROCEDURE — 36415 COLL VENOUS BLD VENIPUNCTURE: CPT

## 2023-02-06 PROCEDURE — 80048 BASIC METABOLIC PNL TOTAL CA: CPT

## 2023-02-13 RX ORDER — AMLODIPINE BESYLATE 5 MG/1
TABLET ORAL
Qty: 90 TABLET | Refills: 0 | Status: SHIPPED | OUTPATIENT
Start: 2023-02-13

## 2023-02-13 NOTE — TELEPHONE ENCOUNTER
Please review; Protocol Failed / No protocol. Requested Prescriptions   Pending Prescriptions Disp Refills    AMLODIPINE 5 MG Oral Tab [Pharmacy Med Name: AMLODIPINE BESYLATE 5 MG Tablet] 90 tablet 0     Sig: TAKE 1 TABLET EVERY DAY. Hypertensive Medications Protocol Failed - 2023  2:36 AM        Failed - Last BP reading less than 140/90     BP Readings from Last 1 Encounters:  23 : 144/83              Passed - In person appointment in the past 12 or next 3 months     Recent Outpatient Visits              4 weeks ago Medicare annual wellness visit, subsequent    [address], 12 [facility], Zoe Medeiros MD    Office Visit    4 months ago Primary hypertension    [address], [facility], Zoe Medeiros MD    Office Visit    11 months ago Physical exam, annual    [address], [facility], [place] Lita Guillaume, MD    Office Visit    11 months ago Dyspnea, unspecified type    [address], [address] [place], [place]    Office Visit    1 year ago Primary hypertension    [facility], [facility], Evelyn Benites MD    Office Visit                      Passed - CMP or BMP in past 6 months     Recent Results (from the past 4392 hour(s))   BASIC METABOLIC PANEL (8)    Collection Time: 23  7:43 AM   Result Value Ref Range    Glucose 105 (H) 70 - 99 mg/dL    Sodium 140 136 - 145 mmol/L    Potassium 3.9 3.5 - 5.1 mmol/L    Chloride 106 98 - 112 mmol/L    CO2 31.0 21.0 - 32.0 mmol/L    Anion Gap 3 0 - 18 mmol/L    BUN 17 7 - 18 mg/dL    Creatinine 0.85 0.55 - 1.02 mg/dL    BUN/CREA Ratio 20.0 10.0 - 20.0    Calcium, Total 9.2 8.5 - 10.1 mg/dL    Calculated Osmolality 292 275 - 295 mOsm/kg    eGFR-Cr 69 >=60 mL/min/1.73m2    Patient Fasting for BMP?  Yes      *Note: Due to a large number of results and/or encounters for the requested time period, some results have not been displayed. A complete set of results can be found in Results Review.                Passed - In person appointment or virtual visit in the past 6 months     Recent Outpatient Visits              4 weeks ago Medicare annual wellness visit, subsequent    [address], [facility], Evelyn Monahan MD    Office Visit    4 months ago Primary hypertension    [address], [facility], [place] Deana Hubert, MD    Office Visit    11 months ago Physical exam, annual    [address], [facility], Evelyn Monahan MD    Office Visit    11 months ago Dyspnea, unspecified type    [employer], [address] [place] [address]    Office Visit    1 year ago Primary hypertension    [address], [place] Deana Hubert, MD    Office Visit                      Passed - EGFRCR or GFRNAA > 50     GFR Evaluation  EGFRCR: 69 , resulted on 2/6/2023                Recent Outpatient Visits              4 weeks ago Medicare annual wellness visit, subsequent    [address], Evelyn Monahan MD    Office Visit    4 months ago Primary hypertension    [address], [place] Deana Hubert, MD    Office Visit    11 months ago Physical exam, annual    [address], [place] Deana Hubert, MD    Office Visit    11 months ago Dyspnea, unspecified type    [employer], [address] [place] [address]    Office Visit    1 year ago Primary hypertension    [address], Elie Lama MD    Office Visit

## 2023-06-08 RX ORDER — HYDROCHLOROTHIAZIDE 12.5 MG/1
12.5 TABLET ORAL EVERY MORNING
Qty: 90 TABLET | Refills: 1 | Status: SHIPPED | OUTPATIENT
Start: 2023-06-08

## 2023-06-09 NOTE — TELEPHONE ENCOUNTER
Please review. Protocol failed / No Protocol. Requested Prescriptions   Pending Prescriptions Disp Refills    HYDROCHLOROTHIAZIDE 12.5 MG Oral Tab [Pharmacy Med Name: HYDROCHLOROTHIAZIDE 12.5 MG Tablet] 90 tablet 1     Sig: TAKE 1 TABLET EVERY MORNING       Hypertensive Medications Protocol Failed - 6/8/2023  2:29 AM        Failed - Last BP reading less than 140/90     BP Readings from Last 1 Encounters:  01/16/23 : 144/83                Passed - In person appointment in the past 12 or next 3 months     Recent Outpatient Visits              4 months ago Medicare annual wellness visit, subsequent    [address], [facility], Kelli Shannon MD    Office Visit    8 months ago Primary hypertension    [address], [facility], [place] Caldwell Hy, MD    Office Visit    1 year ago Physical exam, annual    [address], [facility], Kelli Shannon MD    Office Visit    1 year ago Dyspnea, unspecified type    [address], [address] [place] [address]    Office Visit    1 year ago Primary hypertension    [facility], [facility], [place] Caldwell Hy, MD    Office Visit                      Passed - CMP or BMP in past 6 months     Recent Results (from the past 4392 hour(s))   BASIC METABOLIC PANEL (8)    Collection Time: 02/06/23  7:43 AM   Result Value Ref Range    Glucose 105 (H) 70 - 99 mg/dL    Sodium 140 136 - 145 mmol/L    Potassium 3.9 3.5 - 5.1 mmol/L    Chloride 106 98 - 112 mmol/L    CO2 31.0 21.0 - 32.0 mmol/L    Anion Gap 3 0 - 18 mmol/L    BUN 17 7 - 18 mg/dL    Creatinine 0.85 0.55 - 1.02 mg/dL    BUN/CREA Ratio 20.0 10.0 - 20.0    Calcium, Total 9.2 8.5 - 10.1 mg/dL    Calculated Osmolality 292 275 - 295 mOsm/kg    eGFR-Cr 69 >=60 mL/min/1.73m2    Patient Fasting for BMP?  Yes      *Note: Due to a large number of results and/or encounters for the requested time period, some results have not been displayed. A complete set of results can be found in Results Review.                  Passed - In person appointment or virtual visit in the past 6 months     Recent Outpatient Visits              4 months ago Medicare annual wellness visit, subsequent    [address], [facility], Evelyn Kovacs MD    Office Visit    8 months ago Primary hypertension    [address] [facility], [place] Irene Milling, MD    Office Visit    1 year ago Physical exam, annual    [address],[facility], [facility], Jose Leigh MD    Office Visit    1 year ago Dyspnea, unspecified type    [facility], [address] [place] [address]    Office Visit    1 year ago Primary hypertension    [address] [facility], Jose Leigh MD    Office Visit                      Passed - [facility] or GFRNAA > 50     GFR Evaluation  EGFRCR: 69 , resulted on 2/6/2023

## 2023-07-09 RX ORDER — BENAZEPRIL HYDROCHLORIDE 40 MG/1
40 TABLET, FILM COATED ORAL DAILY
Qty: 90 TABLET | Refills: 3 | Status: SHIPPED | OUTPATIENT
Start: 2023-07-09

## 2023-07-09 NOTE — TELEPHONE ENCOUNTER
BP out of range for protocol.   Please advise on refill request.    Requested Prescriptions     Pending Prescriptions Disp Refills    BENAZEPRIL HCL 40 MG Oral Tab [Pharmacy Med Name: BENAZEPRIL HCL 40 MG Tablet] 90 tablet 1     Sig: TAKE 1 TABLET EVERY DAY      Recent Visits  Date Type Provider Dept   01/16/23 Office Visit Ann-Marie Echeverria MD [facility]   10/04/22 Office Visit Ann-Marie Echeverria MD [facility]   03/16/22 Office Visit Ann-Marie Echeverria MD [facility]   Showing recent visits within past 540 days with a meds authorizing provider and meeting all other requirements  Future Appointments  Date Type Provider Dept   08/16/23 Appointment Ann-Marie Echeverria MD [facility]   Showing future appointments within next 150 days with a meds authorizing provider and meeting all other requirements     Requested Prescriptions   Pending Prescriptions Disp Refills    BENAZEPRIL HCL 40 MG Oral Tab [Pharmacy Med Name: BENAZEPRIL HCL 40 MG Tablet] 90 tablet 1     Sig: TAKE 1 TABLET EVERY DAY       Hypertensive Medications Protocol Failed - 7/8/2023  1:44 AM        Failed - Last BP reading less than 140/90     BP Readings from Last 1 Encounters:  01/16/23 : 144/83              Passed - In person appointment in the past 12 or next 3 months     Recent Outpatient Visits              5 months ago Medicare annual wellness visit, subsequent    [address], [facility], Evelyn Ballard MD    Office Visit    9 months ago Primary hypertension    [address] [place] [address] [place] Ann-Marie Echeverria MD    Office Visit    1 year ago Physical exam, annual    [address] [facility] [place] Beather Negri, MD    Office Visit    1 year ago Dyspnea, unspecified type    [facility], [address] [facility], [place], [place]    Office Visit    1 year ago Primary hypertension    [facility], [address], Piyush Charles, MD Vivian    Office Visit          Future Appointments         Provider Department Appt Notes    In 1 month Nina Vance MD [facility], [facility], [place] Physical health and blood test, minor pain               Passed - CMP or BMP in past 6 months     Recent Results (from the past 4392 hour(s))   BASIC METABOLIC PANEL (8)    Collection Time: 02/06/23  7:43 AM   Result Value Ref Range    Glucose 105 (H) 70 - 99 mg/dL    Sodium 140 136 - 145 mmol/L    Potassium 3.9 3.5 - 5.1 mmol/L    Chloride 106 98 - 112 mmol/L    CO2 31.0 21.0 - 32.0 mmol/L    Anion Gap 3 0 - 18 mmol/L    BUN 17 7 - 18 mg/dL    Creatinine 0.85 0.55 - 1.02 mg/dL    BUN/CREA Ratio 20.0 10.0 - 20.0    Calcium, Total 9.2 8.5 - 10.1 mg/dL    Calculated Osmolality 292 275 - 295 mOsm/kg    eGFR-Cr 69 >=60 mL/min/1.73m2    Patient Fasting for BMP? Yes      *Note: Due to a large number of results and/or encounters for the requested time period, some results have not been displayed. A complete set of results can be found in Results Review.                Passed - In person appointment or virtual visit in the past 6 months     Recent Outpatient Visits              5 months ago Medicare annual wellness visit, subsequent    [address],[facility], [facility], [facility]    Office Visit    9 months ago Primary hypertension    [address], [place] Nina Vance MD    Office Visit    1 year ago Physical exam, annual    [address], [facility]    Office Visit    1 year ago [facility], unspecified type    [facility] [address] [place] [address], [place], [place]    Office Visit    1 year ago Primary hypertension    [address] [facility], Zechariah Rabago MD    Office Visit          Future Appointments         Provider Department Appt Notes    In 1 month Nina Vance MD [facility], [address], [place] Physical health and blood test, minor pain               Passed - EGFRCR or GFRNAA > 50     GFR Evaluation  EGFRCR: 69 , resulted on 2/6/2023              Future Appointments         Provider Department Appt Notes    In 1 month Oscar Ya MD [address], [place] Physical health and blood test, minor pain           Recent Outpatient Visits              5 months ago Tomasa Hutchins annual wellness visit, subsequent    [address] [address] [place], [place]    Office Visit    9 months ago Primary hypertension    [address], [place] Ransom Ruiz, MD    Office Visit    1 year ago Physical exam, annual    [address] Radha Knutson MD    Office Visit    1 year ago Dyspnea, unspecified type    [facility], [address] [place], [place]    Office Visit    1 year ago Primary hypertension    [address], [facility]    Office Visit

## 2023-07-09 NOTE — TELEPHONE ENCOUNTER
BP out of range for protocol. Please advise on refill request.    Requested Prescriptions     Pending Prescriptions Disp Refills    AMLODIPINE 5 MG Oral Tab [Pharmacy Med Name: AMLODIPINE BESYLATE 5 MG Tablet] 90 tablet 0     Sig: TAKE 1 TABLET EVERY DAY. Recent Visits  Date Type Provider Dept   01/16/23 Office Visit Snehal Palma MD [facility]   10/04/22 Office Visit Snehal Palma MD [facility]   03/16/22 Office Visit Snehal Palma MD [facility]   Showing recent visits within past 540 days with a meds authorizing provider and meeting all other requirements  Future Appointments  Date Type Provider Dept   08/16/23 Appointment Snehal Palma MD [facility]   Showing future appointments within next 150 days with a meds authorizing provider and meeting all other requirements     Requested Prescriptions   Pending Prescriptions Disp Refills    AMLODIPINE 5 MG Oral Tab [Pharmacy Med Name: AMLODIPINE BESYLATE 5 MG Tablet] 90 tablet 0     Sig: TAKE 1 TABLET EVERY DAY.        Hypertensive Medications Protocol Failed - 7/8/2023  1:44 AM        Failed - Last BP reading less than 140/90     BP Readings from Last 1 Encounters:  01/16/23 : 144/83              Passed - In person appointment in the past 12 or next 3 months     Recent Outpatient Visits              5 months ago Medicare annual wellness visit, subsequent    [address], [address] [place] [address], Albert Rayo MD    Office Visit    9 months ago Primary hypertension    [address], [place] Snehal Palma MD    Office Visit    1 year ago Physical exam, annual    [address], [facility], [place] Sharon Guarneri, MD    Office Visit    1 year ago Dyspnea, unspecified type    [facility], [address] [facility], [place], [place]    Office Visit    1 year ago Primary hypertension    [facility], [facility] [address], [place], MD Vivian    Office Visit          Future Appointments         Provider Department Appt Notes    In 1 month Ann-Marie Echeverria MD [facility], [facility] [address], [place] Physical health and blood test, minor pain               Passed - CMP or BMP in past 6 months     Recent Results (from the past 4392 hour(s))   BASIC METABOLIC PANEL (8)    Collection Time: 02/06/23  7:43 AM   Result Value Ref Range    Glucose 105 (H) 70 - 99 mg/dL    Sodium 140 136 - 145 mmol/L    Potassium 3.9 3.5 - 5.1 mmol/L    Chloride 106 98 - 112 mmol/L    CO2 31.0 21.0 - 32.0 mmol/L    Anion Gap 3 0 - 18 mmol/L    BUN 17 7 - 18 mg/dL    Creatinine 0.85 0.55 - 1.02 mg/dL    BUN/CREA Ratio 20.0 10.0 - 20.0    Calcium, Total 9.2 8.5 - 10.1 mg/dL    Calculated Osmolality 292 275 - 295 mOsm/kg    eGFR-Cr 69 >=60 mL/min/1.73m2    Patient Fasting for BMP? Yes      *Note: Due to a large number of results and/or encounters for the requested time period, some results have not been displayed. A complete set of results can be found in Results Review.                Passed - In person appointment or virtual visit in the past 6 months     Recent Outpatient Visits              5 months ago Medicare annual wellness visit, subsequent    [address],[facility], [facility], [facility] MD Evelyn    Office Visit    9 months ago Primary hypertension    [address], [place] Beather Negri, MD    Office Visit    1 year ago Physical exam, annual    [address], [facility], Gina Mendieta MD    Office Visit    1 year ago Dyspnea, unspecified type    [facility], [address] [place] [address], [place], [place]    Office Visit    1 year ago Primary hypertension    Gina [place] MD    Office Visit          Future Appointments         Provider Department Appt Notes    In 1 month Ann-Marie Echeverria MD [facility], [address], [place] Physical health and blood test, minor pain               Passed - EGFRCR or GFRNAA > 50     GFR Evaluation  EGFRCR: 69 , resulted on 2/6/2023              Future Appointments         Provider Department Appt Notes    In 1 month Minerva Moeller MD [address],[facility] [address], [address], [place] Physical health and blood test, minor pain           Recent Outpatient Visits              5 months ago Tomasa Hutchins annual wellness visit, subsequent    Mendoza Davis [place], MD    Office Visit    9 months ago Primary hypertension    Bart Rivas [place] Moeller MD    Office Visit    1 year ago Physical exam, annual    Jose Davis MD    Office Visit    1 year ago Dyspnea, unspecified type    [facility], [address] [place] [address]    Office Visit    1 year ago Primary hypertension    [address], [facility], Jsoe Hobbs MD    Office Visit

## 2023-07-10 RX ORDER — AMLODIPINE BESYLATE 5 MG/1
5 TABLET ORAL DAILY
Qty: 90 TABLET | Refills: 3 | Status: SHIPPED | OUTPATIENT
Start: 2023-07-10

## 2023-08-09 ENCOUNTER — OFFICE VISIT (OUTPATIENT)
Dept: INTERNAL MEDICINE CLINIC | Facility: CLINIC | Age: 83
End: 2023-08-09

## 2023-08-09 VITALS
WEIGHT: 140.19 LBS | HEART RATE: 73 BPM | DIASTOLIC BLOOD PRESSURE: 70 MMHG | HEIGHT: 63.5 IN | RESPIRATION RATE: 16 BRPM | SYSTOLIC BLOOD PRESSURE: 128 MMHG | BODY MASS INDEX: 24.53 KG/M2

## 2023-08-09 DIAGNOSIS — I77.1 TORTUOUS AORTA (HCC): ICD-10-CM

## 2023-08-09 DIAGNOSIS — I10 ESSENTIAL HYPERTENSION WITH GOAL BLOOD PRESSURE LESS THAN 130/85: ICD-10-CM

## 2023-08-09 DIAGNOSIS — M54.16 LUMBAR RADICULOPATHY: ICD-10-CM

## 2023-08-09 DIAGNOSIS — E78.5 HYPERLIPIDEMIA LDL GOAL <100: ICD-10-CM

## 2023-08-09 DIAGNOSIS — Z86.69 HISTORY OF GLAUCOMA: ICD-10-CM

## 2023-08-09 DIAGNOSIS — H90.3 SENSORINEURAL HEARING LOSS, BILATERAL: ICD-10-CM

## 2023-08-09 DIAGNOSIS — Z00.00 MEDICARE ANNUAL WELLNESS VISIT, SUBSEQUENT: Primary | ICD-10-CM

## 2023-08-09 DIAGNOSIS — R53.83 OTHER FATIGUE: ICD-10-CM

## 2023-08-09 DIAGNOSIS — M81.0 AGE-RELATED OSTEOPOROSIS WITHOUT CURRENT PATHOLOGICAL FRACTURE: ICD-10-CM

## 2023-08-09 RX ORDER — AMOXICILLIN 500 MG/1
500 CAPSULE ORAL 3 TIMES DAILY
Qty: 30 CAPSULE | Refills: 0 | Status: SHIPPED | OUTPATIENT
Start: 2023-08-09 | End: 2023-08-19

## 2023-08-14 ENCOUNTER — LAB ENCOUNTER (OUTPATIENT)
Dept: LAB | Age: 83
End: 2023-08-14
Attending: INTERNAL MEDICINE
Payer: MEDICARE

## 2023-08-14 DIAGNOSIS — E78.5 HYPERLIPIDEMIA LDL GOAL <100: ICD-10-CM

## 2023-08-14 DIAGNOSIS — R73.9 HYPERGLYCEMIA: ICD-10-CM

## 2023-08-14 DIAGNOSIS — R53.83 OTHER FATIGUE: ICD-10-CM

## 2023-08-14 DIAGNOSIS — I10 ESSENTIAL HYPERTENSION WITH GOAL BLOOD PRESSURE LESS THAN 130/85: ICD-10-CM

## 2023-08-14 LAB
ALBUMIN SERPL-MCNC: 3.6 G/DL (ref 3.4–5)
ALBUMIN/GLOB SERPL: 1 {RATIO} (ref 1–2)
ALP LIVER SERPL-CCNC: 76 U/L
ALT SERPL-CCNC: 34 U/L
ANION GAP SERPL CALC-SCNC: 4 MMOL/L (ref 0–18)
AST SERPL-CCNC: 21 U/L (ref 15–37)
BASOPHILS # BLD AUTO: 0.04 X10(3) UL (ref 0–0.2)
BASOPHILS NFR BLD AUTO: 0.8 %
BILIRUB SERPL-MCNC: 0.3 MG/DL (ref 0.1–2)
BUN BLD-MCNC: 17 MG/DL (ref 7–18)
CALCIUM BLD-MCNC: 9.1 MG/DL (ref 8.5–10.1)
CHLORIDE SERPL-SCNC: 106 MMOL/L (ref 98–112)
CHOLEST SERPL-MCNC: 160 MG/DL (ref ?–200)
CO2 SERPL-SCNC: 29 MMOL/L (ref 21–32)
CREAT BLD-MCNC: 0.91 MG/DL
EGFRCR SERPLBLD CKD-EPI 2021: 63 ML/MIN/1.73M2 (ref 60–?)
EOSINOPHIL # BLD AUTO: 0.12 X10(3) UL (ref 0–0.7)
EOSINOPHIL NFR BLD AUTO: 2.4 %
ERYTHROCYTE [DISTWIDTH] IN BLOOD BY AUTOMATED COUNT: 14.1 %
FASTING PATIENT LIPID ANSWER: YES
FASTING STATUS PATIENT QL REPORTED: YES
GLOBULIN PLAS-MCNC: 3.6 G/DL (ref 2.8–4.4)
GLUCOSE BLD-MCNC: 110 MG/DL (ref 70–99)
HCT VFR BLD AUTO: 42.3 %
HDLC SERPL-MCNC: 40 MG/DL (ref 40–59)
HGB BLD-MCNC: 14 G/DL
IMM GRANULOCYTES # BLD AUTO: 0.01 X10(3) UL (ref 0–1)
IMM GRANULOCYTES NFR BLD: 0.2 %
LDLC SERPL CALC-MCNC: 95 MG/DL (ref ?–100)
LYMPHOCYTES # BLD AUTO: 1.33 X10(3) UL (ref 1–4)
LYMPHOCYTES NFR BLD AUTO: 26.5 %
MCH RBC QN AUTO: 30.3 PG (ref 26–34)
MCHC RBC AUTO-ENTMCNC: 33.1 G/DL (ref 31–37)
MCV RBC AUTO: 91.6 FL
MONOCYTES # BLD AUTO: 0.49 X10(3) UL (ref 0.1–1)
MONOCYTES NFR BLD AUTO: 9.8 %
NEUTROPHILS # BLD AUTO: 3.03 X10 (3) UL (ref 1.5–7.7)
NEUTROPHILS # BLD AUTO: 3.03 X10(3) UL (ref 1.5–7.7)
NEUTROPHILS NFR BLD AUTO: 60.3 %
NONHDLC SERPL-MCNC: 120 MG/DL (ref ?–130)
OSMOLALITY SERPL CALC.SUM OF ELEC: 290 MOSM/KG (ref 275–295)
PLATELET # BLD AUTO: 277 10(3)UL (ref 150–450)
POTASSIUM SERPL-SCNC: 4 MMOL/L (ref 3.5–5.1)
PROT SERPL-MCNC: 7.2 G/DL (ref 6.4–8.2)
RBC # BLD AUTO: 4.62 X10(6)UL
SODIUM SERPL-SCNC: 139 MMOL/L (ref 136–145)
TRIGL SERPL-MCNC: 143 MG/DL (ref 30–149)
TSI SER-ACNC: 0.61 MIU/ML (ref 0.36–3.74)
VLDLC SERPL CALC-MCNC: 23 MG/DL (ref 0–30)
WBC # BLD AUTO: 5 X10(3) UL (ref 4–11)

## 2023-08-14 PROCEDURE — 36415 COLL VENOUS BLD VENIPUNCTURE: CPT

## 2023-08-14 PROCEDURE — 80061 LIPID PANEL: CPT

## 2023-08-14 PROCEDURE — 83036 HEMOGLOBIN GLYCOSYLATED A1C: CPT

## 2023-08-14 PROCEDURE — 84443 ASSAY THYROID STIM HORMONE: CPT

## 2023-08-14 PROCEDURE — 85025 COMPLETE CBC W/AUTO DIFF WBC: CPT

## 2023-08-14 PROCEDURE — 80053 COMPREHEN METABOLIC PANEL: CPT

## 2023-08-16 LAB
EST. AVERAGE GLUCOSE BLD GHB EST-MCNC: 131 MG/DL (ref 68–126)
HBA1C MFR BLD: 6.2 % (ref ?–5.7)

## 2023-11-02 ENCOUNTER — TELEPHONE (OUTPATIENT)
Dept: INTERNAL MEDICINE CLINIC | Facility: CLINIC | Age: 83
End: 2023-11-02

## 2023-11-02 NOTE — TELEPHONE ENCOUNTER
Received fax from [facility]. Forms were placed on provider's desk for review.

## 2023-11-28 ENCOUNTER — TELEPHONE (OUTPATIENT)
Dept: INTERNAL MEDICINE CLINIC | Facility: CLINIC | Age: 83
End: 2023-11-28

## 2024-04-04 ENCOUNTER — TELEPHONE (OUTPATIENT)
Dept: INTERNAL MEDICINE CLINIC | Facility: CLINIC | Age: 84
End: 2024-04-04

## 2024-04-04 NOTE — TELEPHONE ENCOUNTER
Reached out to the patient and left voicemail to call and reschedule appt for Monday April 8 as doctor will not be in

## 2024-05-01 ENCOUNTER — LAB ENCOUNTER (OUTPATIENT)
Dept: LAB | Age: 84
End: 2024-05-01
Attending: INTERNAL MEDICINE
Payer: MEDICARE

## 2024-05-01 ENCOUNTER — TELEPHONE (OUTPATIENT)
Dept: INTERNAL MEDICINE CLINIC | Facility: CLINIC | Age: 84
End: 2024-05-01

## 2024-05-01 ENCOUNTER — OFFICE VISIT (OUTPATIENT)
Dept: INTERNAL MEDICINE CLINIC | Facility: CLINIC | Age: 84
End: 2024-05-01

## 2024-05-01 VITALS
HEIGHT: 63.5 IN | HEART RATE: 68 BPM | DIASTOLIC BLOOD PRESSURE: 66 MMHG | SYSTOLIC BLOOD PRESSURE: 130 MMHG | WEIGHT: 135 LBS | BODY MASS INDEX: 23.62 KG/M2

## 2024-05-01 DIAGNOSIS — H90.3 SENSORINEURAL HEARING LOSS, BILATERAL: ICD-10-CM

## 2024-05-01 DIAGNOSIS — I10 ESSENTIAL HYPERTENSION WITH GOAL BLOOD PRESSURE LESS THAN 130/85: ICD-10-CM

## 2024-05-01 DIAGNOSIS — M81.0 AGE-RELATED OSTEOPOROSIS WITHOUT CURRENT PATHOLOGICAL FRACTURE: ICD-10-CM

## 2024-05-01 DIAGNOSIS — Z00.00 MEDICARE ANNUAL WELLNESS VISIT, SUBSEQUENT: Primary | ICD-10-CM

## 2024-05-01 DIAGNOSIS — I77.1 TORTUOUS AORTA (HCC): ICD-10-CM

## 2024-05-01 DIAGNOSIS — I10 PRIMARY HYPERTENSION: ICD-10-CM

## 2024-05-01 DIAGNOSIS — E78.00 PURE HYPERCHOLESTEROLEMIA: ICD-10-CM

## 2024-05-01 LAB
ALBUMIN SERPL-MCNC: 4.3 G/DL (ref 3.2–4.8)
ALBUMIN/GLOB SERPL: 1.3 {RATIO} (ref 1–2)
ALP LIVER SERPL-CCNC: 76 U/L
ALT SERPL-CCNC: 35 U/L
ANION GAP SERPL CALC-SCNC: 5 MMOL/L (ref 0–18)
AST SERPL-CCNC: 43 U/L (ref ?–34)
BASOPHILS # BLD AUTO: 0.06 X10(3) UL (ref 0–0.2)
BASOPHILS NFR BLD AUTO: 0.8 %
BILIRUB SERPL-MCNC: 0.5 MG/DL (ref 0.2–1.1)
BUN BLD-MCNC: 18 MG/DL (ref 9–23)
BUN/CREAT SERPL: 18.8 (ref 10–20)
CALCIUM BLD-MCNC: 9.4 MG/DL (ref 8.7–10.4)
CHLORIDE SERPL-SCNC: 108 MMOL/L (ref 98–112)
CO2 SERPL-SCNC: 28 MMOL/L (ref 21–32)
CREAT BLD-MCNC: 0.96 MG/DL
DEPRECATED RDW RBC AUTO: 47.8 FL (ref 35.1–46.3)
EGFRCR SERPLBLD CKD-EPI 2021: 59 ML/MIN/1.73M2 (ref 60–?)
EOSINOPHIL # BLD AUTO: 0.05 X10(3) UL (ref 0–0.7)
EOSINOPHIL NFR BLD AUTO: 0.7 %
ERYTHROCYTE [DISTWIDTH] IN BLOOD BY AUTOMATED COUNT: 14 % (ref 11–15)
FASTING STATUS PATIENT QL REPORTED: NO
GLOBULIN PLAS-MCNC: 3.3 G/DL (ref 2.8–4.4)
GLUCOSE BLD-MCNC: 95 MG/DL (ref 70–99)
HCT VFR BLD AUTO: 41 %
HGB BLD-MCNC: 14.1 G/DL
IMM GRANULOCYTES # BLD AUTO: 0.02 X10(3) UL (ref 0–1)
IMM GRANULOCYTES NFR BLD: 0.3 %
LYMPHOCYTES # BLD AUTO: 1.56 X10(3) UL (ref 1–4)
LYMPHOCYTES NFR BLD AUTO: 21.6 %
MCH RBC QN AUTO: 31.9 PG (ref 26–34)
MCHC RBC AUTO-ENTMCNC: 34.4 G/DL (ref 31–37)
MCV RBC AUTO: 92.8 FL
MONOCYTES # BLD AUTO: 0.65 X10(3) UL (ref 0.1–1)
MONOCYTES NFR BLD AUTO: 9 %
NEUTROPHILS # BLD AUTO: 4.89 X10 (3) UL (ref 1.5–7.7)
NEUTROPHILS # BLD AUTO: 4.89 X10(3) UL (ref 1.5–7.7)
NEUTROPHILS NFR BLD AUTO: 67.6 %
OSMOLALITY SERPL CALC.SUM OF ELEC: 294 MOSM/KG (ref 275–295)
PLATELET # BLD AUTO: 257 10(3)UL (ref 150–450)
POTASSIUM SERPL-SCNC: 4.1 MMOL/L (ref 3.5–5.1)
PROT SERPL-MCNC: 7.6 G/DL (ref 5.7–8.2)
RBC # BLD AUTO: 4.42 X10(6)UL
SODIUM SERPL-SCNC: 141 MMOL/L (ref 136–145)
WBC # BLD AUTO: 7.2 X10(3) UL (ref 4–11)

## 2024-05-01 PROCEDURE — 1170F FXNL STATUS ASSESSED: CPT | Performed by: INTERNAL MEDICINE

## 2024-05-01 PROCEDURE — 1159F MED LIST DOCD IN RCRD: CPT | Performed by: INTERNAL MEDICINE

## 2024-05-01 PROCEDURE — 80053 COMPREHEN METABOLIC PANEL: CPT

## 2024-05-01 PROCEDURE — 3008F BODY MASS INDEX DOCD: CPT | Performed by: INTERNAL MEDICINE

## 2024-05-01 PROCEDURE — 96160 PT-FOCUSED HLTH RISK ASSMT: CPT | Performed by: INTERNAL MEDICINE

## 2024-05-01 PROCEDURE — 36415 COLL VENOUS BLD VENIPUNCTURE: CPT

## 2024-05-01 PROCEDURE — 3075F SYST BP GE 130 - 139MM HG: CPT | Performed by: INTERNAL MEDICINE

## 2024-05-01 PROCEDURE — 3078F DIAST BP <80 MM HG: CPT | Performed by: INTERNAL MEDICINE

## 2024-05-01 PROCEDURE — 85025 COMPLETE CBC W/AUTO DIFF WBC: CPT

## 2024-05-01 PROCEDURE — G0439 PPPS, SUBSEQ VISIT: HCPCS | Performed by: INTERNAL MEDICINE

## 2024-05-01 PROCEDURE — 1125F AMNT PAIN NOTED PAIN PRSNT: CPT | Performed by: INTERNAL MEDICINE

## 2024-05-01 RX ORDER — LOSARTAN POTASSIUM 100 MG/1
100 TABLET ORAL DAILY
Qty: 90 TABLET | Refills: 1 | Status: SHIPPED | OUTPATIENT
Start: 2024-05-01

## 2024-05-01 NOTE — TELEPHONE ENCOUNTER
Please see questions below for prior authorization on losartan, please advise on questions  Please sign office note from 05/01/2024, we will need to send attached to prior auth.      Our records show the patient is taking benazepril 40 mg tablet in the same therapeutic/pharmacologic class as the requested drug. Is the plan to keep the patient on both drugs?  YesNo      If the drug in the same therapeutic/pharmacologic class won't be discontinued, please provide additional documentation that would support the use of two or more drugs within the same class:                              More

## 2024-05-01 NOTE — PROGRESS NOTES
Subjective:   Nieves Cagle is a 84 year old female who presents for a MA (Medicare Advantage) Supervisit (Once per calendar year) and scheduled follow up of multiple significant but stable problems.   Patient is feeling well overall, thinks that she has tickle type cough from taking benazepril.  No shortness of breath or chest pains.  Takes all medications as prescribed    History/Other:   Fall Risk Assessment:   She has been screened for Falls and is low risk.      Cognitive Assessment:   She had a completely normal cognitive assessment - see flowsheet entries     Functional Ability/Status:   Nieves Cagle has some abnormal functions as listed below:  She has Hearing problems based on screening of functional status.She has Vision problems based on screening of functional status. She has Walking problems based on screening of functional status.       Depression Screening (PHQ-2/PHQ-9): PHQ-2 SCORE: 0  , done 5/1/2024   If you checked off any problems, how difficult have these problems made it for you to do your work, take care of things at home, or get along with other people?: Not difficult at all       Advanced Directives:   She does have a Living Will but we do NOT have it on file in Epic.    She does have a POA but we do NOT have it on file in Epic.        Patient Active Problem List   Diagnosis    Osteoporosis    Essential hypertension with goal blood pressure less than 130/85    Hyperlipidemia LDL goal <100    Tortuous aorta (HCC)    History of glaucoma    Sensorineural hearing loss, bilateral    Dyspnea     Allergies:  She is allergic to flu virus vaccine.    Current Medications:  Outpatient Medications Marked as Taking for the 5/1/24 encounter (Office Visit) with Vivian Traylor MD   Medication Sig    losartan 100 MG Oral Tab Take 1 tablet (100 mg total) by mouth daily.    amLODIPine 5 MG Oral Tab Take 1 tablet (5 mg total) by mouth daily.    Benazepril HCl 40 MG Oral Tab Take 1 tablet (40 mg  total) by mouth daily.    hydroCHLOROthiazide 12.5 MG Oral Tab Take 1 tablet (12.5 mg total) by mouth every morning.    Glucosamine-Chondroitin (MOVE FREE OR) Take 1 tablet by mouth daily.    vitamin E 400 UNITS Oral Cap Take 1 capsule (400 Units total) by mouth daily.    Multiple Vitamins-Minerals (CENTRUM OR) Take by mouth.    Cholecalciferol (VITAMIN D3) 2000 units Oral Tab To take OTC Vit D3 5000 u for 3 months than 2000 units.start on 4/19/17       Medical History:  She  has a past medical history of Unspecified essential hypertension.  Surgical History:  She  has a past surgical history that includes appendectomy (1956).   Family History:  Her family history is not on file.  Social History:  She  reports that she has never smoked. She has never used smokeless tobacco. She reports that she does not drink alcohol and does not use drugs.    Tobacco:  She has never smoked tobacco.    CAGE Alcohol Screen:   CAGE screening score of 0 on 5/1/2024, showing low risk of alcohol abuse.      Patient Care Team:  Vivian Traylor MD as PCP - General (Internal Medicine)    Review of Systems  ROS:     Constitutional:  Negative for decreased activity, fever, irritability and lethargy  Cardiovascular:  Negative for chest pain and irregular heartbeat/palpitations  Respiratory:  Negative for cough, dyspnea and wheezing.  Eyes:  Negative for eye discharge and vision loss  Endocrine:  Negative for polydipsia and polyphagia  Integumentary:  Negative for pruritus and rash  Neurological:  Negative for gait disturbance, paresthesias.   Psychiatric:  Negative for inappropriate interaction and psychiatric symptoms    Objective:   Physical Exam  Constitutional:       Appearance: Normal appearance. She is obese.   HENT:      Head: Normocephalic and atraumatic.   Eyes:      General: No scleral icterus.     Extraocular Movements: Extraocular movements intact.      Conjunctiva/sclera: Conjunctivae normal.      Pupils: Pupils are equal, round,  and reactive to light.   Neck:      Vascular: No carotid bruit.   Cardiovascular:      Rate and Rhythm: Normal rate and regular rhythm.      Heart sounds: No murmur heard.     No gallop.   Pulmonary:      Effort: Pulmonary effort is normal. No respiratory distress.      Breath sounds: No stridor. No wheezing or rhonchi.   Abdominal:      General: Abdomen is flat. Bowel sounds are normal.      Palpations: Abdomen is soft.      Tenderness: There is no abdominal tenderness. There is no guarding or rebound.   Musculoskeletal:         General: Normal range of motion.      Cervical back: Normal range of motion and neck supple.      Right lower leg: No edema.      Left lower leg: No edema.   Lymphadenopathy:      Cervical: No cervical adenopathy.   Skin:     General: Skin is warm.      Coloration: Skin is not jaundiced.   Neurological:      General: No focal deficit present.      Mental Status: She is alert and oriented to person, place, and time. Mental status is at baseline.   Psychiatric:         Mood and Affect: Mood normal.         Behavior: Behavior normal.         Thought Content: Thought content normal.           /66   Pulse 68   Ht 5' 3.5\" (1.613 m)   Wt 135 lb (61.2 kg)   BMI 23.54 kg/m²  Estimated body mass index is 23.54 kg/m² as calculated from the following:    Height as of this encounter: 5' 3.5\" (1.613 m).    Weight as of this encounter: 135 lb (61.2 kg).    Medicare Hearing Assessment:   Hearing Screening    Time taken: 5/1/2024  1:25 PM  Screening Method: Questionnaire  I have a problem hearing over the telephone: Sometimes I have trouble following the conversations when two or more people are talking at the same time: Sometimes   I have trouble understanding things on the TV: Sometimes I have to strain to understand conversations: Sometimes   I have to worry about missing the telephone ring or doorbell: Sometimes I have trouble hearing conversations in a noisy background such as a crowded room  or restaurant: Sometimes   I get confused about where sounds come from: Sometimes I misunderstand some words in a sentence and need to ask people to repeat themselves: Sometimes   I especially have trouble understanding the speech of women and children: Sometimes I have trouble understanding the speaker in a large room such as at a meeting or place of Yazidism: Sometimes   Many people I talk to seem to mumble (or don't speak clearly): Sometimes People get annoyed because I misunderstand what they say: Sometimes   I misunderstand what others are saying and make inappropriate responses: Sometimes I avoid social activities because I cannot hear well and fear I will reply improperly: Sometimes   Family members and friends have told me they think I may have hearing loss: Sometimes             Visual Acuity:   Right Eye Visual Acuity: Uncorrected Right Eye Chart Acuity: 20/70   Left Eye Visual Acuity: Uncorrected Left Eye Chart Acuity: 20/50   Both Eyes Visual Acuity: Uncorrected Both Eyes Chart Acuity: 20/50   Able To Tolerate Visual Acuity: Yes        Assessment & Plan:   Nieves Cagle is a 84 year old female who presents for a Medicare Assessment.     1. Primary hypertension (Primary) controlled on current medication, patient will discontinue lisinopril will try losartan  -     CBC With Differential With Platelet; Future; Expected date: 05/01/2024  -     Comp Metabolic Panel (14); Future; Expected date: 05/01/2024  -     Losartan Potassium; Take 1 tablet (100 mg total) by mouth daily.  Dispense: 90 tablet; Refill: 1  2. Medicare annual wellness visit, subsequent  3. Sensorineural hearing loss, bilateral stable continue to use hearing aids    5. Tortuous aorta (HCC) stable no treatment needed at  Overview:  CXR 11/2014  6. Age-related osteoporosis without current pathological fracture stable patient declined treatment in the past will check DEXA scan for progression  7. Pure hypercholesterolemia stable controlled  with diet, patient declined statin in the past    The patient indicates understanding of these issues and agrees to the plan.    Follow-up in 6 months    Vivian Traylor MD, 5/1/2024     Supplementary Documentation:   General Health:  In the past six months, have you lost more than 10 pounds without trying?: 2 - No  Has your appetite been poor?: No  Type of Diet: Balanced  How does the patient maintain a good energy level?: Daily Walks  How would you describe your daily physical activity?: Moderate  How would you describe your current health state?: Good  How do you maintain positive mental well-being?: Social Interaction  On a scale of 0 to 10, with 0 being no pain and 10 being severe pain, what is your pain level?: 3 - (Mild)  In the past six months, have you experienced urine leakage?: 1-Yes  At any time do you feel concerned for the safety/well-being of yourself and/or your children, in your home or elsewhere?: No  Have you had any immunizations at another office such as Influenza, Hepatitis B, Tetanus, or Pneumococcal?: No       Nieves Cagle's SCREENING SCHEDULE   Tests on this list are recommended by your physician but may not be covered, or covered at this frequency, by your insurer.   Please check with your insurance carrier before scheduling to verify coverage.   PREVENTATIVE SERVICES FREQUENCY &  COVERAGE DETAILS LAST COMPLETION DATE   Diabetes Screening    Fasting Blood Sugar /  Glucose    One screening every 12 months if never tested or if previously tested but not diagnosed with pre-diabetes   One screening every 6 months if diagnosed with pre-diabetes Lab Results   Component Value Date     (H) 08/14/2023        Cardiovascular Disease Screening    Lipid Panel  Cholesterol  Lipoprotein (HDL)  Triglycerides Covered every 5 years for all Medicare beneficiaries without apparent signs or symptoms of cardiovascular disease Lab Results   Component Value Date    CHOLEST 160 08/14/2023    HDL 40  08/14/2023    LDL 95 08/14/2023    TRIG 143 08/14/2023         Electrocardiogram (EKG)   Covered if needed at Welcome to Medicare, and non-screening if indicated for medical reasons 01/16/2023      Ultrasound Screening for Abdominal Aortic Aneurysm (AAA) Covered once in a lifetime for one of the following risk factors    Men who are 65-75 years old and have ever smoked    Anyone with a family history -     Colorectal Cancer Screening  Covered for ages 50-85; only need ONE of the following:    Colonoscopy   Covered every 10 years    Covered every 2 years if patient is at high risk or previous colonoscopy was abnormal -    No recommendations at this time    Flexible Sigmoidoscopy   Covered every 4 years -    Fecal Occult Blood Test Covered annually -   Bone Density Screening    Bone density screening    Covered every 2 years after age 65 if diagnosed with risk of osteoporosis or estrogen deficiency.    Covered yearly for long-term glucocorticoid medication use (Steroids) Last Dexa Scan:    XR DEXA BONE DENSITOMETRY (CPT=77080) 11/21/2014      No recommendations at this time   Pap and Pelvic    Pap   Covered every 2 years for women at normal risk; Annually if at high risk -  No recommendations at this time    Chlamydia Annually if high risk -  No recommendations at this time   Screening Mammogram    Mammogram     Recommend annually for all female patients aged 40 and older    One baseline mammogram covered for patients aged 35-39 -    No recommendations at this time    Immunizations    Influenza Covered once per flu season  Please get every year -  No recommendations at this time    Pneumococcal Each vaccine ([id number] & [id number]) covered once after 65 Prevnar 13: 10/26/2017    [id number]: 07/15/2019     No recommendations at this time    Hepatitis B One screening covered for patients with certain risk factors   -  No recommendations at this time    Tetanus Toxoid Not covered by Medicare Part B unless medically  necessary (cut with metal); may be covered with your pharmacy prescription benefits -    Tetanus, Diptheria and Pertusis TD and TDaP Not covered by Medicare Part B -  No recommendations at this time    Zoster Not covered by Medicare Part B; may be covered with your pharmacy  prescription benefits -  Zoster Vaccines(1 of 2) Never done     Annual Monitoring of Persistent Medications (ACE/ARB, digoxin diuretics, anticonvulsants)    Potassium Annually Lab Results   Component Value Date    K 4.0 08/14/2023         Creatinine   Annually Lab Results   Component Value Date    CREATSERUM 0.91 08/14/2023         BUN Annually Lab Results   Component Value Date    BUN 17 08/14/2023       Drug Serum Conc Annually No results found for: \"DIGOXIN\", \"DIG\", \"VALP\"

## 2024-05-02 ENCOUNTER — LAB ENCOUNTER (OUTPATIENT)
Dept: LAB | Age: 84
End: 2024-05-02
Attending: INTERNAL MEDICINE
Payer: MEDICARE

## 2024-05-02 DIAGNOSIS — I10 PRIMARY HYPERTENSION: ICD-10-CM

## 2024-05-02 LAB
ALBUMIN SERPL-MCNC: 4.1 G/DL (ref 3.2–4.8)
ALBUMIN/GLOB SERPL: 1.3 {RATIO} (ref 1–2)
ALP LIVER SERPL-CCNC: 87 U/L
ALT SERPL-CCNC: 36 U/L
ANION GAP SERPL CALC-SCNC: 5 MMOL/L (ref 0–18)
AST SERPL-CCNC: 41 U/L (ref ?–34)
BILIRUB SERPL-MCNC: 0.3 MG/DL (ref 0.2–1.1)
BUN BLD-MCNC: 13 MG/DL (ref 9–23)
BUN/CREAT SERPL: 14.8 (ref 10–20)
CALCIUM BLD-MCNC: 9.2 MG/DL (ref 8.7–10.4)
CHLORIDE SERPL-SCNC: 108 MMOL/L (ref 98–112)
CO2 SERPL-SCNC: 27 MMOL/L (ref 21–32)
CREAT BLD-MCNC: 0.88 MG/DL
EGFRCR SERPLBLD CKD-EPI 2021: 65 ML/MIN/1.73M2 (ref 60–?)
FASTING STATUS PATIENT QL REPORTED: NO
GLOBULIN PLAS-MCNC: 3.1 G/DL (ref 2–3.5)
GLUCOSE BLD-MCNC: 115 MG/DL (ref 70–99)
OSMOLALITY SERPL CALC.SUM OF ELEC: 291 MOSM/KG (ref 275–295)
POTASSIUM SERPL-SCNC: 4 MMOL/L (ref 3.5–5.1)
PROT SERPL-MCNC: 7.2 G/DL (ref 5.7–8.2)
SODIUM SERPL-SCNC: 140 MMOL/L (ref 136–145)

## 2024-05-02 PROCEDURE — 80053 COMPREHEN METABOLIC PANEL: CPT

## 2024-05-02 PROCEDURE — 36415 COLL VENOUS BLD VENIPUNCTURE: CPT

## 2024-05-03 ENCOUNTER — TELEPHONE (OUTPATIENT)
Dept: INTERNAL MEDICINE CLINIC | Facility: CLINIC | Age: 84
End: 2024-05-03

## 2024-05-03 NOTE — TELEPHONE ENCOUNTER
Contacted [facility] pharmacy and they confirmed medication refills have been cancelled.

## 2024-05-03 NOTE — TELEPHONE ENCOUNTER
Approved    Prior authorization approved  Payer: Haley Case ID: [id number]    [phone]    [phone]  Note from payer: [place] Case: [id number], Status: Approved, Coverage Starts on: 1/1/2024 12:00:00 AM, Coverage Ends on: 12/31/2024 12:00:00 AM. Questions? Contact [phone].  Approval Details    Authorized from January 1, 2024 to December 31, 2024  Electronic appeal: Not supported

## 2024-05-03 NOTE — TELEPHONE ENCOUNTER
Prior authorization for Losartan completed through [employer]    Medication updated to remove Benazepril.

## 2024-05-03 NOTE — TELEPHONE ENCOUNTER
Response to cancel request received from pharmacy.  Received: Today  [facility]  [facility] Refills  The pharmacy received the electronic cancel request, but could not cancel the prescription.          Pharmacy    [facility] - [place], [place] - [address] [phone], [phone] [address] [address]   Phone: [phone] Fax: [phone]   Hours: Not open 24 hours     Outpatient Medication Detail     Disp Refills Start End    Benazepril HCl 40 MG Oral Tab (Discontinued) 90 tablet 3 7/9/2023 5/2/2024    Sig - Route: Take 1 tablet (40 mg total) by mouth daily. - Oral    Sent to pharmacy as: Benazepril HCl 40 MG Oral Tablet (LOTENSIN)    Reason for Discontinue: Stop This Medication     E-Prescribing Status: Receipt confirmed by pharmacy (7/9/2023 10:19 PM CDT)    E-Cancel Status: Request denied by pharmacy (5/3/2024  7:20 AM CDT)         Order Providers    Prescribing Provider Encounter Provider   Vivian Traylor MD Kandel, Ninel, MD     Medication Notes         Elin Yeh CMA   5/2/2024  8:03 PM  changed to Losartan                Encounter    View Encounter              This Order Has Been Discontinued    Order Status Reason By On   Discontinued Stop This Medication  Elin Yeh CMA 5/2/24 2003

## 2024-05-10 ENCOUNTER — TELEPHONE (OUTPATIENT)
Dept: FAMILY MEDICINE CLINIC | Facility: CLINIC | Age: 84
End: 2024-05-10

## 2024-05-11 NOTE — TELEPHONE ENCOUNTER
----- Message from Olya ISRAEL sent at 5/10/2024  2:03 PM CDT -----  Regarding: FW: Pre Authorization for Losartan  Contact: [phone]    ----- Message -----  From: Nieves Cagle  Sent: 5/10/2024   1:46 PM CDT  To: [facility] Managers  Subject: Pre Authorization for Losartan                   I have taken the 1/2 of losartan and my blood pressure was 157/73.  I have pains in my left shoulder at night and under my breast. I take it at night after dinner and today while I was gardening I felt so weak and dizzy. I lied down and took a nap and I felt better.  Maybe I should only take 1/4 of Losartan and check my progress.  Today is my 5th day.  Please advise.  Thank you.

## 2024-05-11 NOTE — TELEPHONE ENCOUNTER
Spoke with patient. \"She feels the Losartan is causing her to have cramping, weakness and pain to her arms\". She is going to stop taking the Losartan and stated \"I think I would rather take Benazepril instead.\" Advised to speak with her provider regarding taking or stopping any medication.     Dr. Traylor, please advise.

## 2024-05-24 ENCOUNTER — OFFICE VISIT (OUTPATIENT)
Dept: INTERNAL MEDICINE CLINIC | Facility: CLINIC | Age: 84
End: 2024-05-24

## 2024-05-24 VITALS
SYSTOLIC BLOOD PRESSURE: 120 MMHG | HEIGHT: 63.5 IN | WEIGHT: 136 LBS | HEART RATE: 70 BPM | DIASTOLIC BLOOD PRESSURE: 75 MMHG | RESPIRATION RATE: 16 BRPM | BODY MASS INDEX: 23.8 KG/M2

## 2024-05-24 DIAGNOSIS — R05.2 SUBACUTE COUGH: ICD-10-CM

## 2024-05-24 DIAGNOSIS — I10 ESSENTIAL HYPERTENSION WITH GOAL BLOOD PRESSURE LESS THAN 130/85: Primary | ICD-10-CM

## 2024-05-24 PROCEDURE — 3078F DIAST BP <80 MM HG: CPT | Performed by: INTERNAL MEDICINE

## 2024-05-24 PROCEDURE — 3008F BODY MASS INDEX DOCD: CPT | Performed by: INTERNAL MEDICINE

## 2024-05-24 PROCEDURE — 1159F MED LIST DOCD IN RCRD: CPT | Performed by: INTERNAL MEDICINE

## 2024-05-24 PROCEDURE — 99212 OFFICE O/P EST SF 10 MIN: CPT | Performed by: INTERNAL MEDICINE

## 2024-05-24 PROCEDURE — 1126F AMNT PAIN NOTED NONE PRSNT: CPT | Performed by: INTERNAL MEDICINE

## 2024-05-24 PROCEDURE — 3074F SYST BP LT 130 MM HG: CPT | Performed by: INTERNAL MEDICINE

## 2024-05-24 NOTE — PROGRESS NOTES
Subjective:     Patient ID: Nieves Cagle is a 84 year old female.  Presents for follow-up on hypertension    HPI  Patient discontinued losartan, she did not feel well on medication, back on medication reports no cough at all, no shortness of breath, keeps physically active exercises, tries to eat healthy      Current Outpatient Medications   Medication Sig Dispense Refill    amLODIPine 5 MG Oral Tab Take 1 tablet (5 mg total) by mouth daily. 90 tablet 3    hydroCHLOROthiazide 12.5 MG Oral Tab Take 1 tablet (12.5 mg total) by mouth every morning. 90 tablet 1    Glucosamine-Chondroitin (MOVE FREE OR) Take 1 tablet by mouth daily.      vitamin E 400 UNITS Oral Cap Take 1 capsule (400 Units total) by mouth daily.      Multiple Vitamins-Minerals (CENTRUM OR) Take by mouth.      Cholecalciferol (VITAMIN D3) 2000 units Oral Tab To take OTC Vit D3 5000 u for 3 months than 2000 units.start on 4/19/17 1 tablet 0    losartan 100 MG Oral Tab Take 1 tablet (100 mg total) by mouth daily. (Patient not taking: Reported on 5/24/2024) 90 tablet 1     Allergies:  Allergies   Allergen Reactions    Flu Virus Vaccine FATIGUE       Past Medical History:    Unspecified essential hypertension      Past Surgical History:   Procedure Laterality Date    Appendectomy  1956      History reviewed. No pertinent family history.   Social History:   Social History     Socioeconomic History    Marital status:    Tobacco Use    Smoking status: Never    Smokeless tobacco: Never   Vaping Use    Vaping status: Never Used   Substance and Sexual Activity    Alcohol use: No     Alcohol/week: 0.0 standard drinks of alcohol    Drug use: No   Other Topics Concern    Caffeine Concern Yes     Comment: Coffee, 1 cup daily;         /75 (BP Location: Left arm, Patient Position: Sitting, Cuff Size: adult)   Pulse 70   Resp 16   Ht 5' 3.5\" (1.613 m)   Wt 136 lb (61.7 kg)   BMI 23.71 kg/m²    Physical Exam  Constitutional:       Appearance:  Normal appearance.   HENT:      Head: Normocephalic and atraumatic.   Eyes:      General: No scleral icterus.     Extraocular Movements: Extraocular movements intact.      Conjunctiva/sclera: Conjunctivae normal.      Pupils: Pupils are equal, round, and reactive to light.   Neck:      Vascular: No carotid bruit.   Cardiovascular:      Rate and Rhythm: Normal rate and regular rhythm.      Heart sounds: No murmur heard.  Pulmonary:      Effort: Pulmonary effort is normal. No respiratory distress.      Breath sounds: No wheezing or rhonchi.   Abdominal:      General: Bowel sounds are normal.      Palpations: Abdomen is soft.   Musculoskeletal:         General: Normal range of motion.      Cervical back: Normal range of motion and neck supple.      Right lower leg: No edema.      Left lower leg: No edema.   Lymphadenopathy:      Cervical: No cervical adenopathy.   Skin:     General: Skin is warm.      Coloration: Skin is not jaundiced.   Neurological:      General: No focal deficit present.      Mental Status: She is alert and oriented to person, place, and time. Mental status is at baseline.   Psychiatric:         Mood and Affect: Mood normal.         Behavior: Behavior normal.         Thought Content: Thought content normal.         Assessment & Plan:   1. Subacute cough resolved, patient will continue benazepril hydrochlorothiazide, monitor blood pressure at home will get chest x-ray, patient can consider seeing ENT specialist       No orders of the defined types were placed in this encounter.      Meds This Visit:  Requested Prescriptions      No prescriptions requested or ordered in this encounter       Imaging & Referrals:  XR CHEST PA + LAT CHEST (CPT=71046)

## 2024-05-29 ENCOUNTER — HOSPITAL ENCOUNTER (OUTPATIENT)
Dept: GENERAL RADIOLOGY | Age: 84
Discharge: HOME | End: 2024-05-29
Attending: INTERNAL MEDICINE
Payer: MEDICARE

## 2024-05-29 DIAGNOSIS — R05.2 SUBACUTE COUGH: ICD-10-CM

## 2024-05-29 PROCEDURE — 71046 X-RAY EXAM CHEST 2 VIEWS: CPT | Performed by: INTERNAL MEDICINE

## 2024-06-06 RX ORDER — HYDROCHLOROTHIAZIDE 12.5 MG/1
12.5 TABLET ORAL EVERY MORNING
Qty: 90 TABLET | Refills: 3 | Status: SHIPPED | OUTPATIENT
Start: 2024-06-06

## 2024-06-06 NOTE — TELEPHONE ENCOUNTER
Refill passed per [facility] protocol.  Requested Prescriptions   Pending Prescriptions Disp Refills    HYDROCHLOROTHIAZIDE 12.5 MG Oral Tab [Pharmacy Med Name: HYDROCHLOROTHIAZIDE 12.5 MG Tablet] 90 tablet 3     Sig: TAKE 1 TABLET EVERY MORNING       Hypertension Medications Protocol Passed - 6/3/2024  1:57 AM        Passed - CMP or BMP in past 12 months        Passed - Last BP reading less than 140/90     BP Readings from Last 1 Encounters:   05/24/24 120/75               Passed - In person appointment or virtual visit in the past 12 mos or appointment in next 3 mos     Recent Outpatient Visits              1 week ago Essential hypertension with goal blood pressure less than 130/85    [facility], [place] Kandel, Ninel, MD    Office Visit    1 month ago Medicare annual wellness visit, subsequent    [facility], [place] Kandel, Ninel, MD    Office Visit    10 months ago Medicare annual wellness visit, subsequent    [facility], [place] Kandel, Ninel, MD    Office Visit    1 year ago Medicare annual wellness visit, subsequent    [facility], [place] Kandel, Ninel, MD    Office Visit    1 year ago Primary hypertension    [facility], [place] Kandel, Ninel, MD    Office Visit                      Passed - EGFRCR or GFRNAA > 50     GFR Evaluation  EGFRCR: 65 , resulted on 5/2/2024             Recent Outpatient Visits              1 week ago Essential hypertension with goal blood pressure less than 130/85    [facility], [place] Kandel, Ninel, MD    Office Visit    1 month ago Medicare annual wellness visit, subsequent    [facility], [place] Kandel, Ninel, MD    Office Visit    10 months ago Medicare annual wellness visit, subsequent    [facility], [place] Kandel,  MD Vivian    Office Visit    1 year ago Medicare annual wellness visit, subsequent    [facility] [address], [place] Kandel, Ninel, MD    Office Visit    1 year ago Primary hypertension    [facility] [address], [place] Kandel, Ninel, MD    Office Visit

## 2024-08-20 RX ORDER — AMLODIPINE BESYLATE 5 MG/1
5 TABLET ORAL DAILY
Qty: 90 TABLET | Refills: 3 | Status: SHIPPED | OUTPATIENT
Start: 2024-08-20

## 2024-08-20 NOTE — TELEPHONE ENCOUNTER
Refill passed per [facility] protocol.     Requested Prescriptions   Pending Prescriptions Disp Refills    AMLODIPINE 5 MG Oral Tab [Pharmacy Med Name: AMLODIPINE BESYLATE 5 MG Tablet] 90 tablet 3     Sig: TAKE 1 TABLET EVERY DAY       Hypertension Medications Protocol Passed - 8/18/2024 12:42 AM        Passed - CMP or BMP in past 12 months        Passed - Last BP reading less than 140/90     BP Readings from Last 1 Encounters:   05/24/24 120/75               Passed - In person appointment or virtual visit in the past 12 mos or appointment in next 3 mos     Recent Outpatient Visits              2 months ago Essential hypertension with goal blood pressure less than 130/85    [facility], [place] Kandel, Ninel, MD    Office Visit    3 months ago Medicare annual wellness visit, subsequent    [facility] [address], [place] Kandel, Ninel, MD    Office Visit    1 year ago Medicare annual wellness visit, subsequent    [facility] [place] Kandel, Ninel, MD    Office Visit    1 year ago Medicare annual wellness visit, subsequent    [facility] [place] Kandel, Ninel, MD    Office Visit    1 year ago Primary hypertension    [facility] [place] Kandel, Ninel, MD    Office Visit                      Passed - EGFRCR or GFRNAA > 50     GFR Evaluation  EGFRCR: 65 , resulted on 5/2/2024

## 2024-09-25 ENCOUNTER — TELEPHONE (OUTPATIENT)
Dept: INTERNAL MEDICINE CLINIC | Facility: CLINIC | Age: 84
End: 2024-09-25

## 2024-09-25 NOTE — TELEPHONE ENCOUNTER
Reached patient for medication adherence consult. Patient overdue for refill on losartan per insurance report.    Patient reports that she is no longer taking losartan. She states that she felt unwell. She reports that her blood pressure is \"doing well\".  Patient denies any questions or concerns with medication.

## 2024-10-30 ENCOUNTER — OFFICE VISIT (OUTPATIENT)
Dept: INTERNAL MEDICINE CLINIC | Facility: CLINIC | Age: 84
End: 2024-10-30
Payer: MEDICARE

## 2024-10-30 VITALS
BODY MASS INDEX: 24.27 KG/M2 | WEIGHT: 138.69 LBS | HEIGHT: 63.5 IN | DIASTOLIC BLOOD PRESSURE: 81 MMHG | SYSTOLIC BLOOD PRESSURE: 141 MMHG | HEART RATE: 72 BPM

## 2024-10-30 DIAGNOSIS — I10 ESSENTIAL HYPERTENSION WITH GOAL BLOOD PRESSURE LESS THAN 130/85: ICD-10-CM

## 2024-10-30 DIAGNOSIS — R06.02 SHORTNESS OF BREATH: Primary | ICD-10-CM

## 2024-10-30 PROCEDURE — 1126F AMNT PAIN NOTED NONE PRSNT: CPT | Performed by: INTERNAL MEDICINE

## 2024-10-30 PROCEDURE — 3008F BODY MASS INDEX DOCD: CPT | Performed by: INTERNAL MEDICINE

## 2024-10-30 PROCEDURE — 1159F MED LIST DOCD IN RCRD: CPT | Performed by: INTERNAL MEDICINE

## 2024-10-30 PROCEDURE — 99214 OFFICE O/P EST MOD 30 MIN: CPT | Performed by: INTERNAL MEDICINE

## 2024-10-30 PROCEDURE — 3077F SYST BP >= 140 MM HG: CPT | Performed by: INTERNAL MEDICINE

## 2024-10-30 PROCEDURE — 3079F DIAST BP 80-89 MM HG: CPT | Performed by: INTERNAL MEDICINE

## 2024-10-30 PROCEDURE — G2211 COMPLEX E/M VISIT ADD ON: HCPCS | Performed by: INTERNAL MEDICINE

## 2024-11-02 ENCOUNTER — LAB ENCOUNTER (OUTPATIENT)
Dept: LAB | Age: 84
End: 2024-11-02
Attending: INTERNAL MEDICINE
Payer: MEDICARE

## 2024-11-02 DIAGNOSIS — R06.02 SHORTNESS OF BREATH: ICD-10-CM

## 2024-11-02 LAB
ALBUMIN SERPL-MCNC: 4.2 G/DL (ref 3.2–4.8)
ALBUMIN/GLOB SERPL: 1.3 {RATIO} (ref 1–2)
ALP LIVER SERPL-CCNC: 74 U/L
ALT SERPL-CCNC: 23 U/L
ANION GAP SERPL CALC-SCNC: 5 MMOL/L (ref 0–18)
AST SERPL-CCNC: 36 U/L (ref ?–34)
BASOPHILS # BLD AUTO: 0.06 X10(3) UL (ref 0–0.2)
BASOPHILS NFR BLD AUTO: 1 %
BILIRUB SERPL-MCNC: 0.4 MG/DL (ref 0.2–1.1)
BUN BLD-MCNC: 16 MG/DL (ref 9–23)
BUN/CREAT SERPL: 17.2 (ref 10–20)
CALCIUM BLD-MCNC: 9.7 MG/DL (ref 8.7–10.4)
CHLORIDE SERPL-SCNC: 107 MMOL/L (ref 98–112)
CO2 SERPL-SCNC: 26 MMOL/L (ref 21–32)
CREAT BLD-MCNC: 0.93 MG/DL
DEPRECATED RDW RBC AUTO: 48.4 FL (ref 35.1–46.3)
EGFRCR SERPLBLD CKD-EPI 2021: 61 ML/MIN/1.73M2 (ref 60–?)
EOSINOPHIL # BLD AUTO: 0.1 X10(3) UL (ref 0–0.7)
EOSINOPHIL NFR BLD AUTO: 1.7 %
ERYTHROCYTE [DISTWIDTH] IN BLOOD BY AUTOMATED COUNT: 14.4 % (ref 11–15)
FASTING STATUS PATIENT QL REPORTED: YES
GLOBULIN PLAS-MCNC: 3.2 G/DL (ref 2–3.5)
GLUCOSE BLD-MCNC: 92 MG/DL (ref 70–99)
HCT VFR BLD AUTO: 41.2 %
HGB BLD-MCNC: 13.9 G/DL
IMM GRANULOCYTES # BLD AUTO: 0.01 X10(3) UL (ref 0–1)
IMM GRANULOCYTES NFR BLD: 0.2 %
LYMPHOCYTES # BLD AUTO: 1.66 X10(3) UL (ref 1–4)
LYMPHOCYTES NFR BLD AUTO: 29 %
MCH RBC QN AUTO: 30.9 PG (ref 26–34)
MCHC RBC AUTO-ENTMCNC: 33.7 G/DL (ref 31–37)
MCV RBC AUTO: 91.6 FL
MONOCYTES # BLD AUTO: 0.58 X10(3) UL (ref 0.1–1)
MONOCYTES NFR BLD AUTO: 10.1 %
NEUTROPHILS # BLD AUTO: 3.31 X10 (3) UL (ref 1.5–7.7)
NEUTROPHILS # BLD AUTO: 3.31 X10(3) UL (ref 1.5–7.7)
NEUTROPHILS NFR BLD AUTO: 58 %
OSMOLALITY SERPL CALC.SUM OF ELEC: 287 MOSM/KG (ref 275–295)
PLATELET # BLD AUTO: 253 10(3)UL (ref 150–450)
POTASSIUM SERPL-SCNC: 4.3 MMOL/L (ref 3.5–5.1)
PROT SERPL-MCNC: 7.4 G/DL (ref 5.7–8.2)
RBC # BLD AUTO: 4.5 X10(6)UL
SODIUM SERPL-SCNC: 138 MMOL/L (ref 136–145)
WBC # BLD AUTO: 5.7 X10(3) UL (ref 4–11)

## 2024-11-02 PROCEDURE — 80053 COMPREHEN METABOLIC PANEL: CPT

## 2024-11-02 PROCEDURE — 85025 COMPLETE CBC W/AUTO DIFF WBC: CPT

## 2024-11-02 PROCEDURE — 36415 COLL VENOUS BLD VENIPUNCTURE: CPT

## 2024-11-04 NOTE — PROGRESS NOTES
Subjective:     Patient ID: Nieves Cagle is a 84 year old female.  Presents for evaluation of the shortness of breath, follow-up on hypertension  HPI  Patient reports that lately noticed more exertional shortness of breath than usual.  States that since COVID she had prolonged serious illness breathing is not the same, but she is able to do her daily activities, participates in exercise classes.  Lives by herself.  Takes medications as prescribed    Current Outpatient Medications   Medication Sig Dispense Refill    amLODIPine 5 MG Oral Tab Take 1 tablet (5 mg total) by mouth daily. 90 tablet 3    hydroCHLOROthiazide 12.5 MG Oral Tab Take 1 tablet (12.5 mg total) by mouth every morning. 90 tablet 3    Glucosamine-Chondroitin (MOVE FREE OR) Take 1 tablet by mouth daily.      vitamin E 400 UNITS Oral Cap Take 1 capsule (400 Units total) by mouth daily.      Multiple Vitamins-Minerals (CENTRUM OR) Take by mouth.      Cholecalciferol (VITAMIN D3) 2000 units Oral Tab To take OTC Vit D3 5000 u for 3 months than 2000 units.start on 4/19/17 1 tablet 0     Allergies:Allergies[1]    Past Medical History:    Unspecified essential hypertension      Past Surgical History:   Procedure Laterality Date    Appendectomy  1956      History reviewed. No pertinent family history.   Social History:   Social History     Socioeconomic History    Marital status:    Tobacco Use    Smoking status: Never    Smokeless tobacco: Never   Vaping Use    Vaping status: Never Used   Substance and Sexual Activity    Alcohol use: No     Alcohol/week: 0.0 standard drinks of alcohol    Drug use: No   Other Topics Concern    Caffeine Concern Yes     Comment: Coffee, 1 cup daily;         /81 (BP Location: Left arm, Patient Position: Sitting, Cuff Size: adult)   Pulse 72   Ht 5' 3.5\" (1.613 m)   Wt 138 lb 11.2 oz (62.9 kg)   BMI 24.18 kg/m²    Physical Exam  Constitutional:       Appearance: Normal appearance.   Eyes:       Extraocular Movements: Extraocular movements intact.      Conjunctiva/sclera: Conjunctivae normal.      Pupils: Pupils are equal, round, and reactive to light.   Cardiovascular:      Rate and Rhythm: Normal rate and regular rhythm.      Heart sounds: No murmur heard.  Pulmonary:      Effort: Pulmonary effort is normal.      Breath sounds: No wheezing or rhonchi.   Musculoskeletal:         General: Normal range of motion.      Cervical back: Normal range of motion and neck supple.      Right lower leg: No edema.      Left lower leg: No edema.   Lymphadenopathy:      Cervical: No cervical adenopathy.   Neurological:      General: No focal deficit present.      Mental Status: She is alert and oriented to person, place, and time. Mental status is at baseline.   Psychiatric:         Mood and Affect: Mood normal.         Behavior: Behavior normal.         Thought Content: Thought content normal.         Assessment & Plan:   1. Shortness of breath etiology not clear will start with a pulmonary function test, chest x-ray was normal in May 2024   2. Essential hypertension with goal blood pressure less than 130/85    Continue present medication, will check CBC CMP    Orders Placed This Encounter   Procedures    CBC With Differential With Platelet    Comp Metabolic Panel (14)       Meds This Visit:  Requested Prescriptions      No prescriptions requested or ordered in this encounter       Imaging & Referrals:  None            [1]   Allergies  Allergen Reactions    Flu Virus Vaccine FATIGUE

## 2025-02-17 ENCOUNTER — TELEPHONE (OUTPATIENT)
Dept: INTERNAL MEDICINE CLINIC | Facility: CLINIC | Age: 85
End: 2025-02-17

## 2025-02-17 NOTE — TELEPHONE ENCOUNTER
Patient is due for medicare annual wellness visit for 2025 with PCP or coordinating NP. This can be done anytime in the calendar year.    Unable to leave message, busy signal.

## 2025-04-09 ENCOUNTER — OFFICE VISIT (OUTPATIENT)
Dept: INTERNAL MEDICINE CLINIC | Facility: CLINIC | Age: 85
End: 2025-04-09

## 2025-04-09 VITALS
HEART RATE: 70 BPM | HEIGHT: 63.5 IN | SYSTOLIC BLOOD PRESSURE: 130 MMHG | WEIGHT: 143.19 LBS | DIASTOLIC BLOOD PRESSURE: 69 MMHG | BODY MASS INDEX: 25.06 KG/M2

## 2025-04-09 DIAGNOSIS — I10 ESSENTIAL HYPERTENSION WITH GOAL BLOOD PRESSURE LESS THAN 130/85: ICD-10-CM

## 2025-04-09 DIAGNOSIS — R53.83 OTHER FATIGUE: ICD-10-CM

## 2025-04-09 DIAGNOSIS — H90.3 SENSORINEURAL HEARING LOSS, BILATERAL: ICD-10-CM

## 2025-04-09 DIAGNOSIS — R73.03 PREDIABETES: ICD-10-CM

## 2025-04-09 DIAGNOSIS — H91.93 DECREASED HEARING OF BOTH EARS: ICD-10-CM

## 2025-04-09 DIAGNOSIS — Z00.00 MEDICARE ANNUAL WELLNESS VISIT, SUBSEQUENT: Primary | ICD-10-CM

## 2025-04-09 DIAGNOSIS — R06.02 SHORTNESS OF BREATH: ICD-10-CM

## 2025-04-09 RX ORDER — ALBUTEROL SULFATE 90 UG/1
2 INHALANT RESPIRATORY (INHALATION) EVERY 4 HOURS PRN
Qty: 3 EACH | Refills: 0 | Status: SHIPPED | OUTPATIENT
Start: 2025-04-09 | End: 2025-04-09

## 2025-04-09 RX ORDER — ALBUTEROL SULFATE 90 UG/1
2 INHALANT RESPIRATORY (INHALATION) EVERY 4 HOURS PRN
Qty: 1 EACH | Refills: 0 | Status: SHIPPED | OUTPATIENT
Start: 2025-04-09

## 2025-04-10 NOTE — PROGRESS NOTES
Subjective:   Nieves Cagle is a 85 year old female who presents for a MA AHA (Medicare Advantage Annual Health Assessment) and Subsequent Annual Wellness visit (Pt already had Initial Annual Wellness) and scheduled follow up of multiple significant but stable problems.     Patient reports that she gets easier tired during the day most of the energy in the morning.  Finds herself being short of breath on exertion like taking stairs or walking fast.  Feels unsteady on her feet  History/Other:   Fall Risk Assessment:   She has been screened for Falls and is High Risk. Fall Prevention information provided to patient in After Visit Summary.    Do you feel unsteady when standing or walking?: Yes  Do you worry about falling?: Yes  Have you fallen in the past year?: No     Cognitive Assessment:   She had a completely normal cognitive assessment - see flowsheet entries     Functional Ability/Status:   Nieves Cagle has some abnormal functions as listed below:  She has Walking problems based on screening of functional status.       Depression Screening (PHQ):  PHQ-2 SCORE: 0  , done 4/9/2025       Advanced Directives:   She does have a Living Will but we do NOT have it on file in Epic.    She does have a POA but we do NOT have it on file in Epic.      Problem List[1]  Allergies:  She is allergic to flu virus vaccine.    Current Medications:  Active Meds, Sig Only[2]    Medical History:  She  has a past medical history of Unspecified essential hypertension.  Surgical History:  She  has a past surgical history that includes appendectomy (1956).   Family History:  Her family history is not on file.  Social History:  She  reports that she has never smoked. She has never been exposed to tobacco smoke. She has never used smokeless tobacco. She reports that she does not drink alcohol and does not use drugs.    Tobacco:  She has never smoked tobacco.    CAGE Alcohol Screen:   CAGE screening score of 0 on 4/9/2025,  showing low risk of alcohol abuse.      Patient Care Team:  Vivian Traylor MD as PCP - General (Internal Medicine)    Review of Systems  ROS:     Constitutional:  Negative for decreased activity, fever, irritability and lethargy  Cardiovascular:  Negative for chest pain and irregular heartbeat/palpitations  Respiratory:  Negative for coughand wheezing.  Eyes:  Negative for eye discharge and vision loss  Endocrine:  Negative for polydipsia and polyphagia  Integumentary:  Negative for pruritus and rash  Neurological:  Negative for headache, dizziness, paresthesias.   Psychiatric:  Negative for inappropriate interaction and psychiatric symptoms      Physical Exam  Constitutional:       Appearance: Normal appearance.   HENT:      Head: Normocephalic and atraumatic.   Eyes:      Extraocular Movements: Extraocular movements intact.      Pupils: Pupils are equal, round, and reactive to light.   Cardiovascular:      Rate and Rhythm: Normal rate and regular rhythm.   Pulmonary:      Effort: Pulmonary effort is normal. No respiratory distress.      Breath sounds: No wheezing or rhonchi.   Musculoskeletal:         General: Normal range of motion.      Cervical back: Normal range of motion and neck supple.      Right lower leg: No edema.      Left lower leg: No edema.   Skin:     General: Skin is warm.      Coloration: Skin is not jaundiced.   Neurological:      General: No focal deficit present.      Mental Status: She is alert and oriented to person, place, and time. Mental status is at baseline.   Psychiatric:         Mood and Affect: Mood normal.         Behavior: Behavior normal.         Thought Content: Thought content normal.         Judgment: Judgment normal.         /69 (BP Location: Left arm, Patient Position: Sitting, Cuff Size: adult)   Pulse 70   Ht 5' 3.5\" (1.613 m)   Wt 143 lb 3.2 oz (65 kg)   BMI 24.97 kg/m²  Estimated body mass index is 24.97 kg/m² as calculated from the following:    Height as of  this encounter: 5' 3.5\" (1.613 m).    Weight as of this encounter: 143 lb 3.2 oz (65 kg).    Medicare Hearing Assessment:   Hearing Screening    Time taken: 4/9/2025  2:25 PM  Entry User: Nivia Graham MA  Screening Method: Finger Rub  Finger Rub Result: Pass (Comment: hearing aids)         Visual Acuity:   Right Eye Visual Acuity: Corrected Right Eye Chart Acuity: 20/50     Left Eye Chart Acuity: 20/50     Both Eyes Chart Acuity: 20/50   Able To Tolerate Visual Acuity: Yes        Assessment & Plan:   Nieves Cagle is a 85 year old female who presents for a Medicare Assessment.     1. Medicare annual wellness visit, subsequent (Primary)  2. Sensorineural hearing loss, bilateral audiology needs adjustment on hearing aids  3. Essential hypertension with goal blood pressure less than 130/85 controlled on current medication will check labs  -     CARD ECHO 2D DOPPLER (CPT=93306); Future; Expected date: 04/09/2025  -     CBC With Differential With Platelet; Future; Expected date: 04/09/2025  -     Comp Metabolic Panel (14); Future; Expected date: 04/09/2025  -     TSH W Reflex To Free T4; Future; Expected date: 04/09/2025  4. Shortness of breath rule out cardiomyopathy, history of COVID prolonged we will try albuterol inhaler 2 puffs before physical exertion, follow-up if not helpful  -     CARD ECHO 2D DOPPLER (CPT=93306); Future; Expected date: 04/09/2025    6. Prediabetes stable check hemoglobin A1c continue low carbohydrate diet and regular physical activities as tolerated  -     Hemoglobin A1C; Future; Expected date: 04/09/2025  7. Other fatigue rule out hypothyroidism check TSH reflex treat if necessary  -     TSH W Reflex To Free T4; Future; Expected date: 04/09/2025  Other orders  -     Discontinue: Albuterol Sulfate HFA; Inhale 2 puffs into the lungs every 4 (four) hours as needed.  Dispense: 3 each; Refill: 0  -     Albuterol Sulfate HFA; Inhale 2 puffs into the lungs every 4 (four) hours as  needed.  Dispense: 1 each; Refill: 0    The patient indicates understanding of these issues and agrees to the plan.    Follow-up in 6 months  Follow-up in 6 months  Vivian Traylor MD, 4/9/2025     Supplementary Documentation:   General Health:  In the past six months, have you lost more than 10 pounds without trying?: 2 - No  Has your appetite been poor?: No  Type of Diet: Balanced  How does the patient maintain a good energy level?: Appropriate Exercise  How would you describe your daily physical activity?: Moderate  How would you describe your current health state?: Good  How do you maintain positive mental well-being?: Social Interaction, Visiting Friends  On a scale of 0 to 10, with 0 being no pain and 10 being severe pain, what is your pain level?: 2 - (Mild)  In the past six months, have you experienced urine leakage?: 0-No  At any time do you feel concerned for the safety/well-being of yourself and/or your children, in your home or elsewhere?: No  Have you had any immunizations at another office such as Influenza, Hepatitis B, Tetanus, or Pneumococcal?: No    Health Maintenance   Topic Date Due    Zoster Vaccines (1 of 2) Never done    COVID-19 Vaccine (3 - 2024-25 season) 09/01/2024    Annual Well Visit  01/01/2025    Influenza Vaccine (Season Ended) 10/01/2025    DEXA Scan  Completed    Annual Depression Screening  Completed    Fall Risk Screening (Annual)  Completed    Pneumococcal Vaccine: 50+ Years  Completed    Meningococcal B Vaccine  Aged Out            [1]   Patient Active Problem List  Diagnosis    Osteoporosis    Essential hypertension with goal blood pressure less than 130/85    Hyperlipidemia LDL goal <100    Tortuous aorta    History of glaucoma    Sensorineural hearing loss, bilateral    Dyspnea   [2]   Outpatient Medications Marked as Taking for the 4/9/25 encounter (Office Visit) with Vivian Traylor MD   Medication Sig    albuterol (VENTOLIN HFA) 108 (90 Base) MCG/ACT Inhalation Aero Soln  Inhale 2 puffs into the lungs every 4 (four) hours as needed.    amLODIPine 5 MG Oral Tab Take 1 tablet (5 mg total) by mouth daily.    hydroCHLOROthiazide 12.5 MG Oral Tab Take 1 tablet (12.5 mg total) by mouth every morning.    Glucosamine-Chondroitin (MOVE FREE OR) Take 1 tablet by mouth daily.    vitamin E 400 UNITS Oral Cap Take 1 capsule (400 Units total) by mouth daily.    Multiple Vitamins-Minerals (CENTRUM OR) Take by mouth.    Cholecalciferol (VITAMIN D3) 2000 units Oral Tab To take OTC Vit D3 5000 u for 3 months than 2000 units.start on 4/19/17

## 2025-04-14 ENCOUNTER — LAB ENCOUNTER (OUTPATIENT)
Dept: LAB | Age: 85
End: 2025-04-14
Attending: INTERNAL MEDICINE
Payer: MEDICARE

## 2025-04-14 DIAGNOSIS — R73.03 PREDIABETES: ICD-10-CM

## 2025-04-14 DIAGNOSIS — R53.83 OTHER FATIGUE: ICD-10-CM

## 2025-04-14 DIAGNOSIS — I10 ESSENTIAL HYPERTENSION WITH GOAL BLOOD PRESSURE LESS THAN 130/85: ICD-10-CM

## 2025-04-14 LAB
ALBUMIN SERPL-MCNC: 4.3 G/DL (ref 3.2–4.8)
ALBUMIN/GLOB SERPL: 1.5 {RATIO} (ref 1–2)
ALP LIVER SERPL-CCNC: 80 U/L (ref 55–142)
ALT SERPL-CCNC: 28 U/L (ref 10–49)
ANION GAP SERPL CALC-SCNC: 7 MMOL/L (ref 0–18)
AST SERPL-CCNC: 35 U/L (ref ?–34)
BASOPHILS # BLD AUTO: 0.04 X10(3) UL (ref 0–0.2)
BASOPHILS NFR BLD AUTO: 0.5 %
BILIRUB SERPL-MCNC: 0.4 MG/DL (ref 0.2–1.1)
BUN BLD-MCNC: 14 MG/DL (ref 9–23)
BUN/CREAT SERPL: 15.1 (ref 10–20)
CALCIUM BLD-MCNC: 9.1 MG/DL (ref 8.7–10.4)
CHLORIDE SERPL-SCNC: 106 MMOL/L (ref 98–112)
CO2 SERPL-SCNC: 27 MMOL/L (ref 21–32)
CREAT BLD-MCNC: 0.93 MG/DL (ref 0.55–1.02)
DEPRECATED RDW RBC AUTO: 48.4 FL (ref 35.1–46.3)
EGFRCR SERPLBLD CKD-EPI 2021: 61 ML/MIN/1.73M2 (ref 60–?)
EOSINOPHIL # BLD AUTO: 0.03 X10(3) UL (ref 0–0.7)
EOSINOPHIL NFR BLD AUTO: 0.4 %
ERYTHROCYTE [DISTWIDTH] IN BLOOD BY AUTOMATED COUNT: 13.9 % (ref 11–15)
EST. AVERAGE GLUCOSE BLD GHB EST-MCNC: 123 MG/DL (ref 68–126)
FASTING STATUS PATIENT QL REPORTED: NO
GLOBULIN PLAS-MCNC: 2.9 G/DL (ref 2–3.5)
GLUCOSE BLD-MCNC: 113 MG/DL (ref 70–99)
HBA1C MFR BLD: 5.9 % (ref ?–5.7)
HCT VFR BLD AUTO: 44.5 % (ref 35–48)
HGB BLD-MCNC: 14.6 G/DL (ref 12–16)
IMM GRANULOCYTES # BLD AUTO: 0.03 X10(3) UL (ref 0–1)
IMM GRANULOCYTES NFR BLD: 0.4 %
LYMPHOCYTES # BLD AUTO: 1.04 X10(3) UL (ref 1–4)
LYMPHOCYTES NFR BLD AUTO: 13.4 %
MCH RBC QN AUTO: 30.5 PG (ref 26–34)
MCHC RBC AUTO-ENTMCNC: 32.8 G/DL (ref 31–37)
MCV RBC AUTO: 93.1 FL (ref 80–100)
MONOCYTES # BLD AUTO: 0.48 X10(3) UL (ref 0.1–1)
MONOCYTES NFR BLD AUTO: 6.2 %
NEUTROPHILS # BLD AUTO: 6.16 X10 (3) UL (ref 1.5–7.7)
NEUTROPHILS # BLD AUTO: 6.16 X10(3) UL (ref 1.5–7.7)
NEUTROPHILS NFR BLD AUTO: 79.1 %
OSMOLALITY SERPL CALC.SUM OF ELEC: 291 MOSM/KG (ref 275–295)
PLATELET # BLD AUTO: 251 10(3)UL (ref 150–450)
POTASSIUM SERPL-SCNC: 4.4 MMOL/L (ref 3.5–5.1)
PROT SERPL-MCNC: 7.2 G/DL (ref 5.7–8.2)
RBC # BLD AUTO: 4.78 X10(6)UL (ref 3.8–5.3)
SODIUM SERPL-SCNC: 140 MMOL/L (ref 136–145)
T3FREE SERPL-MCNC: 3.07 PG/ML (ref 2.4–4.2)
T4 FREE SERPL-MCNC: 1.3 NG/DL (ref 0.8–1.7)
TSI SER-ACNC: 0.42 UIU/ML (ref 0.55–4.78)
WBC # BLD AUTO: 7.8 X10(3) UL (ref 4–11)

## 2025-04-14 PROCEDURE — 84481 FREE ASSAY (FT-3): CPT

## 2025-04-14 PROCEDURE — 84443 ASSAY THYROID STIM HORMONE: CPT

## 2025-04-14 PROCEDURE — 84439 ASSAY OF FREE THYROXINE: CPT

## 2025-04-14 PROCEDURE — 83036 HEMOGLOBIN GLYCOSYLATED A1C: CPT

## 2025-04-14 PROCEDURE — 80053 COMPREHEN METABOLIC PANEL: CPT

## 2025-04-14 PROCEDURE — 85025 COMPLETE CBC W/AUTO DIFF WBC: CPT

## 2025-04-14 PROCEDURE — 36415 COLL VENOUS BLD VENIPUNCTURE: CPT

## 2025-04-22 RX ORDER — BENAZEPRIL HYDROCHLORIDE 40 MG/1
40 TABLET ORAL DAILY
Qty: 90 TABLET | Refills: 3 | Status: SHIPPED | OUTPATIENT
Start: 2025-04-22

## 2025-04-22 RX ORDER — AMLODIPINE BESYLATE 5 MG/1
5 TABLET ORAL DAILY
Qty: 90 TABLET | Refills: 3 | Status: SHIPPED | OUTPATIENT
Start: 2025-04-22

## 2025-04-22 NOTE — TELEPHONE ENCOUNTER
Office visit note on 05/24/2024:  1. Subacute cough resolved, patient will continue benazepril hydrochlorothiazide, monitor blood pressure at home will get chest x-ray, patient can consider seeing ENT specialist

## 2025-04-22 NOTE — TELEPHONE ENCOUNTER
Patient is requesting a refill on her amlodipine and  benazepril 40 mg/not listed Per the patient she is out of  medication. Pharmacy: [facility] (listed)     Current Outpatient Medications   Medication Sig Dispense Refill    amLODIPine 5 MG Oral Tab Take 1 tablet (5 mg total) by mouth daily. 90 tablet 3

## 2025-04-22 NOTE — TELEPHONE ENCOUNTER
Please review; protocol failed/No Protocol      Patient states is out of medication- and is requesting refill     Please advise was discontinued on 05/02/2024 but then at office visit on 05/24/2024- patient is to continue Benazepril

## 2025-04-22 NOTE — TELEPHONE ENCOUNTER
Refill passed per Clinic protocol.  Requested Prescriptions   Pending Prescriptions Disp Refills    amLODIPine 5 MG Oral Tab 90 tablet 3     Sig: Take 1 tablet (5 mg total) by mouth daily.       Hypertension Medications Protocol Passed - 4/22/2025  9:33 AM        Passed - CMP or BMP in past 12 months        Passed - Last BP reading less than 140/90     BP Readings from Last 1 Encounters:   04/09/25 130/69               Passed - In person appointment or virtual visit in the past 12 mos or appointment in next 3 mos     Recent Outpatient Visits              1 week ago Medicare annual wellness visit, subsequent    [facility], [address], [place], [place], [place]    Office Visit    5 months ago Shortness of breath    [facility] [address], [place], [place], [place]    Office Visit    11 months ago Essential hypertension with goal blood pressure less than 130/85    [facility] [address], [place], [place], [place]    Office Visit    11 months ago Medicare annual wellness visit, subsequent    [facility], [address], [place], [place], [place]    Office Visit    1 year ago Medicare annual wellness visit, subsequent    [facility], [address], [place], [place], [place]    Office Visit                      Passed - EGFRCR or GFRNAA > 50     GFR Evaluation  EGFRCR: 61 , resulted on 4/14/2025          Passed - Medication is active on med list          Benazepril HCl 40 MG Oral Tab 90 tablet 3     Sig: Take 1 tablet (40 mg total) by mouth daily.       There is no refill protocol information for this order

## 2025-04-22 NOTE — TELEPHONE ENCOUNTER
Amlodipine 5m year supply sent to [facility] pharmacy on 2024    Patient requesting [facility]

## 2025-04-23 NOTE — TELEPHONE ENCOUNTER
Patient (name and  verified) calling to report that the benazepril Rx was not sent to [facility]. Reviewed the chart, noted the medication was sent yesterday. Will call [facility] to verify when they open at 9am.

## 2025-04-23 NOTE — TELEPHONE ENCOUNTER
Called the [facility] pharmacy, spoke with Cristina and they do have the Rx and will get it ready for the patient.     Called patient (name and  verified) to notify that her medication will be ready today per pharmacy.

## 2025-07-18 ENCOUNTER — HOSPITAL ENCOUNTER (OUTPATIENT)
Dept: CV DIAGNOSTICS | Facility: HOSPITAL | Age: 85
Discharge: HOME | End: 2025-07-18
Attending: INTERNAL MEDICINE
Payer: MEDICARE

## 2025-07-18 DIAGNOSIS — I10 ESSENTIAL HYPERTENSION WITH GOAL BLOOD PRESSURE LESS THAN 130/85: ICD-10-CM

## 2025-07-18 DIAGNOSIS — R06.02 SHORTNESS OF BREATH: ICD-10-CM

## 2025-07-18 PROCEDURE — 93306 TTE W/DOPPLER COMPLETE: CPT | Performed by: INTERNAL MEDICINE

## 2025-07-18 PROCEDURE — 76376 3D RENDER W/INTRP POSTPROCES: CPT | Performed by: INTERNAL MEDICINE

## 2025-07-19 ENCOUNTER — RESULTS FOLLOW-UP (OUTPATIENT)
Dept: INTERNAL MEDICINE CLINIC | Facility: CLINIC | Age: 85
End: 2025-07-19

## 2025-07-31 ENCOUNTER — OFFICE VISIT (OUTPATIENT)
Dept: AUDIOLOGY | Facility: CLINIC | Age: 85
End: 2025-07-31

## 2025-07-31 ENCOUNTER — OFFICE VISIT (OUTPATIENT)
Dept: OTOLARYNGOLOGY | Facility: CLINIC | Age: 85
End: 2025-07-31

## 2025-07-31 DIAGNOSIS — H91.90 HEARING LOSS, UNSPECIFIED HEARING LOSS TYPE, UNSPECIFIED LATERALITY: Primary | ICD-10-CM

## 2025-07-31 DIAGNOSIS — H90.3 SENSORINEURAL HEARING LOSS (SNHL) OF BOTH EARS: Primary | ICD-10-CM

## 2025-07-31 PROCEDURE — 99203 OFFICE O/P NEW LOW 30 MIN: CPT | Performed by: OTOLARYNGOLOGY

## 2025-07-31 PROCEDURE — 92557 COMPREHENSIVE HEARING TEST: CPT | Performed by: AUDIOLOGIST

## 2025-07-31 PROCEDURE — 1160F RVW MEDS BY RX/DR IN RCRD: CPT | Performed by: OTOLARYNGOLOGY

## 2025-07-31 PROCEDURE — 1159F MED LIST DOCD IN RCRD: CPT | Performed by: OTOLARYNGOLOGY

## 2025-08-06 ENCOUNTER — TELEPHONE (OUTPATIENT)
Dept: INTERNAL MEDICINE CLINIC | Facility: CLINIC | Age: 85
End: 2025-08-06

## 2025-08-08 ENCOUNTER — OFFICE VISIT (OUTPATIENT)
Dept: INTERNAL MEDICINE CLINIC | Facility: CLINIC | Age: 85
End: 2025-08-08

## 2025-08-08 VITALS
SYSTOLIC BLOOD PRESSURE: 139 MMHG | DIASTOLIC BLOOD PRESSURE: 88 MMHG | HEART RATE: 86 BPM | BODY MASS INDEX: 24.85 KG/M2 | WEIGHT: 142 LBS | HEIGHT: 63.5 IN

## 2025-08-08 DIAGNOSIS — R30.0 DYSURIA: ICD-10-CM

## 2025-08-08 DIAGNOSIS — I10 ESSENTIAL HYPERTENSION WITH GOAL BLOOD PRESSURE LESS THAN 130/85: Primary | ICD-10-CM

## 2025-08-08 LAB
APPEARANCE: CLEAR
BILIRUBIN: NEGATIVE
GLUCOSE (URINE DIPSTICK): NEGATIVE MG/DL
KETONES (URINE DIPSTICK): NEGATIVE MG/DL
NITRITE, URINE: NEGATIVE
PH, URINE: 7 (ref 4.5–8)
PROTEIN (URINE DIPSTICK): NEGATIVE MG/DL
SPECIFIC GRAVITY: 1.02 (ref 1–1.03)
URINE-COLOR: YELLOW
UROBILINOGEN,SEMI-QN: 0.2 MG/DL (ref 0–1.9)

## 2025-08-08 PROCEDURE — 1160F RVW MEDS BY RX/DR IN RCRD: CPT | Performed by: INTERNAL MEDICINE

## 2025-08-08 PROCEDURE — 99214 OFFICE O/P EST MOD 30 MIN: CPT | Performed by: INTERNAL MEDICINE

## 2025-08-08 PROCEDURE — 81000 URINALYSIS NONAUTO W/SCOPE: CPT | Performed by: INTERNAL MEDICINE

## 2025-08-08 PROCEDURE — 1159F MED LIST DOCD IN RCRD: CPT | Performed by: INTERNAL MEDICINE

## 2025-08-08 PROCEDURE — 1126F AMNT PAIN NOTED NONE PRSNT: CPT | Performed by: INTERNAL MEDICINE

## 2025-08-08 RX ORDER — ALBUTEROL SULFATE 90 UG/1
2 INHALANT RESPIRATORY (INHALATION) EVERY 4 HOURS PRN
Qty: 3 EACH | Refills: 1 | Status: SHIPPED | OUTPATIENT
Start: 2025-08-08

## 2025-08-08 RX ORDER — CEFPODOXIME PROXETIL 200 MG/1
200 TABLET, FILM COATED ORAL 2 TIMES DAILY
Qty: 14 TABLET | Refills: 0 | Status: SHIPPED | OUTPATIENT
Start: 2025-08-08

## 2025-08-08 RX ORDER — AMLODIPINE BESYLATE 5 MG/1
5 TABLET ORAL DAILY
Qty: 90 TABLET | Refills: 3 | Status: SHIPPED | OUTPATIENT
Start: 2025-08-08 | End: 2026-08-03

## 2025-08-12 ENCOUNTER — LAB ENCOUNTER (OUTPATIENT)
Dept: LAB | Age: 85
End: 2025-08-12
Attending: INTERNAL MEDICINE

## 2025-08-12 DIAGNOSIS — R53.83 OTHER FATIGUE: ICD-10-CM

## 2025-08-12 LAB — TSI SER-ACNC: 0.56 UIU/ML (ref 0.55–4.78)

## 2025-08-12 PROCEDURE — 84443 ASSAY THYROID STIM HORMONE: CPT

## 2025-08-12 PROCEDURE — 36415 COLL VENOUS BLD VENIPUNCTURE: CPT

## (undated) NOTE — LETTER
7/28/2021              Nieves Cagle        [address]        [place] [id number]         To whom it may concern.     My patient suffered COVID-19 viral infection that caused pneumonia, she required hospitalization and treatment at [facility]

## (undated) NOTE — MR AVS SNAPSHOT
[facility]  [address] [place] [address], [address] [place] [address]  Yovany Reema               Thank you for choosing us for your health care visit with Erma Mccoy MD.  We are glad to serve you and happy to provide you with this summary of yo Risedronate Sodium 5 MG Tabs   Take 1 tablet (5 mg total) by mouth every morning before breakfast.   Commonly known as:  ACTONEL                Where to Get Your Medications      These medications were sent to [facility] [address] [place] [address] active are less likely to develop some chronic diseases than adults who are inactive.      HOW TO GET STARTED: HOW TO STAY MOTIVATED:   Start activities slowly and build up over time Do what you like   Get your heart pumping – brisk walking, biking, swimmin